# Patient Record
Sex: FEMALE | Race: WHITE | NOT HISPANIC OR LATINO | Employment: FULL TIME
[De-identification: names, ages, dates, MRNs, and addresses within clinical notes are randomized per-mention and may not be internally consistent; named-entity substitution may affect disease eponyms.]

---

## 2016-01-01 LAB — PAP SMEAR - HIM PATIENT REPORTED: NEGATIVE

## 2017-10-29 ENCOUNTER — HEALTH MAINTENANCE LETTER (OUTPATIENT)
Age: 32
End: 2017-10-29

## 2018-08-31 LAB
HBA1C MFR BLD: 5 % (ref 4.8–5.6)
HBV SURFACE AG SERPL QL IA: NEGATIVE
HIV 1+2 AB+HIV1 P24 AG SERPL QL IA: NON REACTIVE
RUBELLA ANTIBODY IGG QUANTITATIVE: NORMAL IU/ML
TSH SERPL-ACNC: 1.84 UIU/ML (ref 0.45–4.5)

## 2018-09-05 ENCOUNTER — TRANSFERRED RECORDS (OUTPATIENT)
Dept: HEALTH INFORMATION MANAGEMENT | Facility: CLINIC | Age: 33
End: 2018-09-05

## 2018-09-10 ENCOUNTER — NURSE TRIAGE (OUTPATIENT)
Dept: NURSING | Facility: CLINIC | Age: 33
End: 2018-09-10

## 2018-09-10 ENCOUNTER — OFFICE VISIT (OUTPATIENT)
Dept: FAMILY MEDICINE | Facility: CLINIC | Age: 33
End: 2018-09-10
Payer: COMMERCIAL

## 2018-09-10 VITALS
TEMPERATURE: 98.5 F | HEART RATE: 64 BPM | HEIGHT: 65 IN | SYSTOLIC BLOOD PRESSURE: 108 MMHG | DIASTOLIC BLOOD PRESSURE: 70 MMHG | WEIGHT: 237 LBS | RESPIRATION RATE: 20 BRPM | BODY MASS INDEX: 39.49 KG/M2

## 2018-09-10 DIAGNOSIS — E66.01 MORBID OBESITY (H): ICD-10-CM

## 2018-09-10 DIAGNOSIS — M54.50 ACUTE MIDLINE LOW BACK PAIN WITHOUT SCIATICA: Primary | ICD-10-CM

## 2018-09-10 PROCEDURE — 99213 OFFICE O/P EST LOW 20 MIN: CPT | Performed by: PHYSICIAN ASSISTANT

## 2018-09-10 NOTE — TELEPHONE ENCOUNTER
Leah has a history of back surgery and is currently 11.5 weeks pregnant and was in bed the entire weekend. Leah is requesting to schedule appointment.    Denies fever.

## 2018-09-10 NOTE — LETTER
[facility]  [address] [place] [address], [address]  [facility] [id number] [phone]          September 10, 2018    RE:  Leah Do                                                                                                                                                       [address]  [facility] [address]            To whom it may concern:    Leah Do is under my professional care for Acute midline low back pain without sciatica . She may return to work with the following:     When the patient returns to work, the following restrictions apply.  A) Bend: Occasionally (1-3 hours)  B) Squat: Frequently (4-8 hours)  C) Walk/Stand: Frequently (4-8 hours)  D) Reach Above Shoulders: Frequently (4-8 hours)  E) Lift, carry, push, and pull no more than: 11-20 lbs.       Sincerely,        Gualberto Vazquez PA-C

## 2018-09-10 NOTE — TELEPHONE ENCOUNTER
"  Reason for Disposition    [1] MODERATE back pain (e.g., interferes with normal activities) AND [2] present > 3 days    Additional Information    Negative: Passed out (i.e., lost consciousness, collapsed and was not responding)    Negative: Shock suspected (e.g., cold/pale/clammy skin, too weak to stand, low BP, rapid pulse)    Negative: Sounds like a life-threatening emergency to the triager    Negative: [1] SEVERE back pain (e.g., excruciating) AND [2] sudden onset AND [3] age > 60    Negative: [1] Unable to urinate (or only a few drops) > 4 hours AND     [2] bladder feels very full (e.g., palpable bladder or strong urge to urinate)    Negative: [1] Urinary or bowel incontinence (i.e., loss of bladder or bowel control) AND [2] new onset    Negative: Numbness in groin or rectal area (i.e., loss of sensation)    Negative: [1] SEVERE abdominal pain AND [2] present > 1 hour    Negative: [1] Abdominal pain AND [2] age > 60    Negative: Weakness of a leg or foot (e.g., unable to bear weight, dragging foot)    Negative: Unable to walk    Negative: Patient sounds very sick or weak to the triager    Negative: [1] SEVERE back pain (e.g., excruciating, unable to do any normal activities) AND [2] not improved 2 hours after pain medicine    Negative: [1] Pain radiates into the thigh or further down the leg AND [2] both legs    Negative: [1] Fever > 100.5 F (38.1 C) AND [2] flank pain (i.e., in side, below ribs and above hip)    Negative: [1] Pain or burning with urination AND [2] flank pain (i.e., in side, below ribs and above hip)    Negative: Numbness in a leg or foot (i.e., loss of sensation)    Negative: High-risk adult (e.g., history of cancer, HIV, or IV drug abuse)    Negative: [1] Fever AND [2] no symptoms of UTI  (Exception: has generalized muscle pains, not localized back pain)    Negative: Rash in same area as pain (may be described as \"small blisters\")    Negative: Blood in urine (red, pink, or " tea-colored)    Protocols used: BACK PAIN-ADULT-AH

## 2018-09-10 NOTE — PROGRESS NOTES
"  SUBJECTIVE:   Leah Do is a 32 year old female who presents to clinic today for the following health issues:      Back Pain       Duration: 9/6/18 - is 11.5 weeks pregnant        Specific cause: none    Description:   Location of pain: low back bilateral  Character of pain: sharp, dull ache, stabbing, gnawing, burning, fullness and waxing and waning  Pain radiation:none  New numbness or weakness in legs, not attributed to pain:  no     Intensity: severe    History:   Pain interferes with job: YES  History of back problems: surgery of lumbar fusion  Any previous MRI or X-rays: Yes- at [facility].  Date see chart  Sees a specialist for back pain:  No  Therapies tried without relief: acetaminophen (Tylenol), cold and rest    Alleviating factors:   Improved by: acetaminophen (Tylenol), cold and rest      Precipitating factors:  Worsened by: laying on left side, sitting for long periods of time, bending  1-2     Patient is 11 weeks pregnant and here to discuss bilateral low back pain that has started bothering her as of last week.  She works in retail.  Standing is actually OK.  Sitting is worse.  When laying on left side in bed it's much worse than laying on the right.  Finds it even difficult to get out of bed when she has been laying on her left.  The pain is low back bilateral without radiation.  Previous history of lumbar fusion x 2 in   2011 and 2014.  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  Fused L6-5 the first surgery, then second was to revise and fuse L5-4.  Tylenol and ice and been only minimally helpful.  Not using heat much for fear of overheating while pregnant.  She works full time and has another young child at home.    [facility].      Problem list and histories reviewed & adjusted, as indicated.  Additional history: as documented      Reviewed and updated as needed this visit by clinical staff  Tobacco  Allergies  Meds  Med Hx  Surg Hx  Fam Hx  " "Soc Hx      Reviewed and updated as needed this visit by Provider         ROS:  Constitutional, HEENT, cardiovascular, pulmonary, gi and gu systems are negative, except as otherwise noted.    OBJECTIVE:     /70 (BP Location: Right arm, Patient Position: Sitting, Cuff Size: Adult Large)  Pulse 64  Temp 98.5  F (36.9  C) (Oral)  Resp 20  Ht 5' 4.5\" (1.638 m)  Wt 237 lb (107.5 kg)  BMI 40.05 kg/m2  Body mass index is 40.05 kg/(m^2).  GENERAL: healthy, alert and no distress  MS: no gross musculoskeletal defects noted, no edema  SKIN: no suspicious lesions or rashes  NEURO: Normal strength and tone, mentation intact and speech normal  Comprehensive back pain exam:  Strength is full with knee flexion and extension, plantar and dorsiflexion, and big toe extension bilaterally. Sensation intact throughout bilateral lower extremities. Patellar reflexes intact bilaterally.  PSYCH: mentation appears normal, affect normal/bright    Diagnostic Test Results:  none     ASSESSMENT/PLAN:   1. Acute midline low back pain without sciatica  Discussed that first line treatment in pregnancy is physical therapy.  She can also use ice/heat and tylenol.  She will discuss with her OB if pain becomes intolerable.  - [facility] PT, HAND, AND CHIROPRACTIC REFERRAL    2. Morbid obesity (H)  - be aware of this in pregnancy.      Gualberto Vazquez PA-C  [facility]    "

## 2018-09-10 NOTE — MR AVS SNAPSHOT
After Visit Summary   9/10/2018    Leah Do    MRN: [id number]           Patient Information     Date Of Birth          1985        Visit Information        Provider Department      9/10/2018 10:00 AM Gualberto Vazquez PA-C [facility]        Today's Diagnoses     Acute midline low back pain without sciatica    -  1       Follow-ups after your visit        Additional Services     [facility] PT, HAND, AND CHIROPRACTIC REFERRAL       **This order will print in the [facility] Scheduling Office**    Physical Therapy, Hand Therapy and Chiropractic Care are available through:    *[facility]  *[facility]  *[facility]    Call one number to schedule at any of the above locations: [phone].    Your provider has referred you to: Physical Therapy at [facility] or [facility]    Indication/Reason for Referral: Low Back Pain  Onset of Illness: 1-2 weeks  Therapy Orders: Evaluate and Treat  Special Programs: None and Women's Health: OB/GYN Musculoskeletal Dysfunction: LBP/Sacral Pain and Pregnancy: 11 Weeks Gestation and  Joint Mobilization, Myofascial Release/Massage, Posture/Body Mechanics, Strengthening, Stretching and Ultrasound  Special Request:     Milana Cortés      Additional Comments for the Therapist or Chiropractor:     Please be aware that coverage of these services is subject to the terms and limitations of your health insurance plan.  Call member services at your health plan with any benefit or coverage questions.      Please bring the following to your appointment:    *Your personal calendar for scheduling future appointments  *Comfortable clothing                  Follow-up notes from your care team     Return in about 3 weeks (around 10/1/2018) for if symptoms are not improving.      Your next 10 appointments already scheduled     Oct 30, 2018 10:15 AM CDT   Genetic Counseling with RH GEN COUNSELOR 1   [facility] Maternal Fetal Medicine  "- [facility])    [address]  [facility] [address]   [phone]            Oct 30, 2018 11:00 AM CDT   LUCERO US COMP with [id number]   [facility] Maternal Fetal Medicine Ultrasound - [facility])    [address]  [facility] [address] [phone]           Wear comfortable clothes and leave your valuables at home.            Oct 30, 2018 11:30 AM CDT   Radiology MD with  LUCERO BHATTI   [facility] Maternal Fetal Medicine - [facility])    [address]  [facility] [id number] [phone]           Please arrive at the time given for your first appointment. This visit is used internally to schedule the physician's time during your ultrasound.              Who to contact     If you have questions or need follow up information about today's clinic visit or your schedule please contact [facility] directly at [phone].  Normal or non-critical lab and imaging results will be communicated to you by [employer], letter or phone within 4 business days after the clinic has received the results. If you do not hear from us within 7 days, please contact the clinic through [facility] or phone. If you have a critical or abnormal lab result, we will notify you by phone as soon as possible.  Submit refill requests through [employer] or call your pharmacy and they will forward the refill request to us. Please allow 3 business days for your refill to be completed.          Additional Information About Your Visit        PopUpsters Information     [employer] lets you send messages to your doctor, view your test results, renew your prescriptions, schedule appointments and more. To sign up, go to [email or web address] . Click on \"Log in\" on the left side of the screen, which will take you to the Welcome page. Then click on \"Sign up Now\" on the right side of the page.     You will be asked to enter the access " "code listed below, as well as some personal information. Please follow the directions to create your username and password.     Your access code is: [email or web address]  Expires: 2018 11:07 AM     Your access code will  in 90 days. If you need help or a new code, please call your [facility] clinic or [phone].        Care EveryWhere ID     This is your Care EveryWhere ID. This could be used by other organizations to access your [facility] medical records  [id number]        Your Vitals Were     Pulse Temperature Respirations Height BMI (Body Mass Index)       64 98.5  F (36.9  C) (Oral) 20 5' 4.5\" (1.638 m) 40.05 kg/m2        Blood Pressure from Last 3 Encounters:   09/10/18 108/70   04/10/14 111/69   13 102/49    Weight from Last 3 Encounters:   09/10/18 237 lb (107.5 kg)   14 188 lb (85.3 kg)   13 191 lb 5.8 oz (86.8 kg)              We Performed the Following     [facility] PT, HAND, AND CHIROPRACTIC REFERRAL        Primary Care Provider Office Phone # Fax #    Josee Zoe Garcia [phone]       [address] [place] [address]        Equal Access to Services     [facility] Center[facility]ALO : Hadii aad ku hadasho Soomaali, waaxda luqadaha, qaybta kaalmada adeegyada, samantha murcia . So [facility] [phone].    ATENCIÓN: Si habla español, tiene a schaeffer disposición servicios gratuitos de asistencia lingüística. Llame al [phone].    We comply with applicable federal civil rights laws and [place] laws. We do not discriminate on the basis of race, color, national origin, age, disability, sex, sexual orientation, or gender identity.            Thank you!     Thank you for choosing [facility]  for your care. Our goal is always to provide you with excellent care. Hearing back from our patients is one way we can continue to improve our services. Please take a few minutes to complete the written survey that you may receive in the mail after " your visit with us. Thank you!             Your Updated Medication List - Protect others around you: Learn how to safely use, store and throw away your medicines at www.disposemymeds.org.          This list is accurate as of 9/10/18 11:07 AM.  Always use your most recent med list.                   Brand Name Dispense Instructions for use Diagnosis    calcium carbonate 600 mg-vitamin D 400 units 600-400 MG-UNIT per tablet    CALTRATE     Take 1 tablet by mouth        DHA COMPLETE PO           folic acid 400 MCG tablet    FOLVITE     Take 2 mg by mouth 2 times daily        PRENATAL VITAMINS PO      Take 1 tablet by mouth At Bedtime        VITAMIN D3 PO      Take 2,000 Units by mouth 2 times daily

## 2018-09-13 ENCOUNTER — THERAPY VISIT (OUTPATIENT)
Dept: PHYSICAL THERAPY | Facility: CLINIC | Age: 33
End: 2018-09-13
Payer: COMMERCIAL

## 2018-09-13 DIAGNOSIS — M54.50 CHRONIC BILATERAL LOW BACK PAIN WITHOUT SCIATICA: Primary | ICD-10-CM

## 2018-09-13 DIAGNOSIS — G89.29 CHRONIC BILATERAL LOW BACK PAIN WITHOUT SCIATICA: Primary | ICD-10-CM

## 2018-09-13 PROCEDURE — 97110 THERAPEUTIC EXERCISES: CPT | Mod: GP | Performed by: PHYSICAL THERAPIST

## 2018-09-13 PROCEDURE — 97161 PT EVAL LOW COMPLEX 20 MIN: CPT | Mod: GP | Performed by: PHYSICAL THERAPIST

## 2018-09-13 NOTE — PROGRESS NOTES
[facility] Initial Evaluation  Subjective:  Patient is a 32 year old female presenting with rehab back hpi. The history is provided by the patient. No  was used.   Leah Do is a 32 year old female with a lumbar condition.  Condition occurred with:  Bending.  Condition occurred: at home.  This is a recurrent and new condition  PT reports haivng bilateral LBP that has been present for 2 weeks.  No known recent trauma.  History of L4-L6 fusions.  She is 12 weeks pregnant.  .    Patient reports pain:  Central lumbar spine.  Radiates to:  No radiation.  Pain is described as aching and is intermittent and reported as 5/10.  Associated symptoms:  Loss of motion and loss of motion/stiffness. Pain is worse during the day.  Symptoms are exacerbated by sitting and stress and relieved by ice and heat.  Since onset symptoms are gradually improving.        General health as reported by patient is fair.  Pertinent medical history includes:  Currently pregnant, implanted device and overweight.  Medical allergies: yes (See EPIC).    Current medications:  Pain medication.  Current occupation is [employer]   .  Patient is currently not working due to present treatment problem.  Primary job tasks include:  Lifting, prolonged standing and repetitive tasks.    Barriers include:  None as reported by the patient.    Red flags:  None as reported by the patient.                        Objective:  Standing Alignment:        Lumbar:  Lordosis incr  Pelvic:  Normal  Hip:  Normal            Flexibility/Screens:   Positive screens:  Lumbar and SI Joint                   Lumbar/SI Evaluation  ROM:    AROM Lumbar:   Flexion:          100% with end range stiffness  Ext:                    15% wiht ER tightness   Side Bend:        Left:  100%    Right:  100%  Rotation:           Left:     Right:   Side Glide:        Left:     Right:           Lumbar Myotomes:   normal                    Lumbar Palpation:    Tenderness present at Left:    PSIS  Tenderness not present at Left:    Quadratus Lumborum; Erector Spinae; Piriformis or Vertebral  Tenderness present at Right: PSIS  Tenderness not present at Right:  Quadratus Lumborum; Erector Spinae; Piriformis or Vertebral                                                     General     ROS    Assessment/Plan:    Patient is a 32 year old female with lumbar complaints.    Patient has the following significant findings with corresponding treatment plan.                Diagnosis 1:  LBP with pregnancy  Pain -  self management, education, directional preference exercise and home program  Decreased ROM/flexibility - manual therapy and therapeutic exercise  Decreased strength - therapeutic exercise and therapeutic activities  Impaired muscle performance - neuro re-education  Decreased function - therapeutic activities    Therapy Evaluation Codes:   1) History comprised of:   Personal factors that impact the plan of care:      None.    Comorbidity factors that impact the plan of care are:      Current pregnancy, Implanted device and Overweight.     Medications impacting care: Pain.  2) Examination of Body Systems comprised of:   Body structures and functions that impact the plan of care:      Lumbar spine.   Activity limitations that impact the plan of care are:      Sitting and Standing.  3) Clinical presentation characteristics are:   Stable/Uncomplicated.  4) Decision-Making    Low complexity using standardized patient assessment instrument and/or measureable assessment of functional outcome.  Cumulative Therapy Evaluation is: Low complexity.    Previous and current functional limitations:  (See Goal Flow Sheet for this information)    Short term and Long term goals: (See Goal Flow Sheet for this information)     Communication ability:  Patient appears to be able to clearly communicate and understand verbal and written communication and  follow directions correctly.  Treatment Explanation - The following has been discussed with the patient:   RX ordered/plan of care  Anticipated outcomes  Possible risks and side effects  This patient would benefit from PT intervention to resume normal activities.   Rehab potential is good.    Frequency:  1 X week, once daily  Duration:  for 4 weeks  Discharge Plan:  Achieve all LTG.  Independent in home treatment program.  Reach maximal therapeutic benefit.    Please refer to the daily flowsheet for treatment today, total treatment time and time spent performing 1:1 timed codes.

## 2018-09-13 NOTE — MR AVS SNAPSHOT
After Visit Summary   9/13/2018    Leah Do    MRN: [id number]           Patient Information     Date Of Birth          1985        Visit Information        Provider Department      9/13/2018 11:10 AM Christopher Veloz, PT [facility] Athletic Medicine [facility] Physical [facility]        Today's Diagnoses     Chronic bilateral low back pain without sciatica    -  1       Follow-ups after your visit        Your next 10 appointments already scheduled     Oct 30, 2018 10:15 AM CDT   Genetic Counseling with  GEN COUNSELOR 1   [facility])    [address]  [facility] [address]   [phone]            Oct 30, 2018 11:00 AM CDT   MFM US COMP with [id number]   [facility] Maternal Fetal Medicine Ultrasound - [facility])    [address]  [facility] [id number] [phone]           Wear comfortable clothes and leave your valuables at home.            Oct 30, 2018 11:30 AM CDT   Radiology MD with  LUCERO BHATTI   [facility] Maternal Fetal Medicine [facility])    [address]  [facility] [id number] [phone]           Please arrive at the time given for your first appointment. This visit is used internally to schedule the physician's time during your ultrasound.              Who to contact     If you have questions or need follow up information about today's clinic visit or your schedule please contact [facility] PH[facility] directly at [phone].  Normal or non-critical lab and imaging results will be communicated to you by MyChart, letter or phone within 4 business days after the clinic has received the results. If you do not hear from us within 7 days, please contact the clinic through MyChart or phone. If you have a critical or abnormal lab result, we will notify you by phone as soon as  "possible.  Submit refill requests through [email or web address] or call your pharmacy and they will forward the refill request to us. Please allow 3 business days for your refill to be completed.          Additional Information About Your Visit        [email or web address] Information     [email or web address] lets you send messages to your doctor, view your test results, renew your prescriptions, schedule appointments and more. To sign up, go to [email or web address] . Click on \"Log in\" on the left side of the screen, which will take you to the Welcome page. Then click on \"Sign up Now\" on the right side of the page.     You will be asked to enter the access code listed below, as well as some personal information. Please follow the directions to create your username and password.     Your access code is: [email or web address]  Expires: 2018 11:07 AM     Your access code will  in 90 days. If you need help or a new code, please call your [facility] clinic or [phone].        Care EveryWhere ID     This is your Care EveryWhere ID. This could be used by other organizations to access your [facility] medical records  [id number]         Blood Pressure from Last 3 Encounters:   09/10/18 108/70   04/10/14 111/69   13 102/49    Weight from Last 3 Encounters:   09/10/18 107.5 kg (237 lb)   14 85.3 kg (188 lb)   13 86.8 kg (191 lb 5.8 oz)              We Performed the Following     HC PT EVAL, LOW COMPLEXITY     [facility] INITIAL EVAL REPORT     THERAPEUTIC EXERCISES        Primary Care Provider Office Phone # Fax #    Josee Zoe Garcia [phone]       [address] [place] [address]        Equal Access to Services     MONIKA BENAVIDES : Darius Blank, freda hassan, tu bullockalmaisaias galvan, samantha welch. So [facility] [phone].    ATENCIÓN: Si habla español, tiene a schaeffer disposición servicios gratuitos de asistencia lingüística. Llame al [phone].    We comply with " applicable federal civil rights laws and [place] laws. We do not discriminate on the basis of race, color, national origin, age, disability, sex, sexual orientation, or gender identity.            Thank you!     Thank you for choosing [facility] PHYSICAL THERAPY  for your care. Our goal is always to provide you with excellent care. Hearing back from our patients is one way we can continue to improve our services. Please take a few minutes to complete the written survey that you may receive in the mail after your visit with us. Thank you!             Your Updated Medication List - Protect others around you: Learn how to safely use, store and throw away your medicines at www.disposemymeds.org.          This list is accurate as of 9/13/18 11:41 AM.  Always use your most recent med list.                   Brand Name Dispense Instructions for use Diagnosis    calcium carbonate 600 mg-vitamin D 400 units 600-400 MG-UNIT per tablet    CALTRATE     Take 1 tablet by mouth        DHA COMPLETE PO           folic acid 400 MCG tablet    FOLVITE     Take 2 mg by mouth 2 times daily        PRENATAL VITAMINS PO      Take 1 tablet by mouth At Bedtime        VITAMIN D3 PO      Take 2,000 Units by mouth 2 times daily

## 2018-09-22 ENCOUNTER — HOSPITAL ENCOUNTER (EMERGENCY)
Facility: CLINIC | Age: 33
Discharge: HOME | End: 2018-09-22
Attending: EMERGENCY MEDICINE | Admitting: EMERGENCY MEDICINE
Payer: COMMERCIAL

## 2018-09-22 VITALS
TEMPERATURE: 98.7 F | DIASTOLIC BLOOD PRESSURE: 91 MMHG | RESPIRATION RATE: 20 BRPM | BODY MASS INDEX: 39.27 KG/M2 | HEART RATE: 71 BPM | HEIGHT: 64 IN | OXYGEN SATURATION: 100 % | SYSTOLIC BLOOD PRESSURE: 144 MMHG | WEIGHT: 230 LBS

## 2018-09-22 DIAGNOSIS — O46.90 VAGINAL BLEEDING IN PREGNANCY: ICD-10-CM

## 2018-09-22 PROCEDURE — 76815 OB US LIMITED FETUS(S): CPT

## 2018-09-22 PROCEDURE — 99284 EMERGENCY DEPT VISIT MOD MDM: CPT | Mod: 25

## 2018-09-22 ASSESSMENT — ENCOUNTER SYMPTOMS
VOMITING: 1
NAUSEA: 1
DIARRHEA: 0
HEMATURIA: 0
FREQUENCY: 0
BLOOD IN STOOL: 0
CONSTIPATION: 0
DYSURIA: 0

## 2018-09-22 NOTE — DISCHARGE INSTRUCTIONS
Please make an appointment to follow up with OB/GYN in 2-3 days if not improving.      Return to ER immediately if you develop: worsening bleeding (>1 pad or tampon per hour), severe abdominal or pelvic pain, new lightheadedness/dizziness/shortness of breath, Fever > 101, persistent nausea or vomiting OR you have any other concerns about your health.

## 2018-09-22 NOTE — ED AVS SNAPSHOT
[facility] Emergency Department    [address]    [facility] [address]    Phone:  [phone]    Fax:  [phone]                                       Leah Do   MRN: [id number]    Department:  [facility] Emergency Department   Date of Visit:  9/22/2018           After Visit Summary Signature Page     I have received my discharge instructions, and my questions have been answered. I have discussed any challenges I see with this plan with the nurse or doctor.    ..........................................................................................................................................  Patient/Patient Representative Signature      ..........................................................................................................................................  Patient Representative Print Name and Relationship to Patient    ..................................................               ................................................  Date                                   Time    ..........................................................................................................................................  Reviewed by Signature/Title    ...................................................              ..............................................  Date                                               Time          22EPIC Rev 08/18

## 2018-09-22 NOTE — ED AVS SNAPSHOT
[facility] Emergency Department    [address] [facility] [address]    Phone:  [phone]    Fax:  [phone]                                       Leah Do   MRN: [id number]    Department:  [facility] Emergency Department   Date of Visit:  9/22/2018           Patient Information     Date Of Birth          1985        Your diagnoses for this visit were:     Vaginal bleeding in pregnancy        You were seen by Luis Carlos Guidry MD.        Discharge Instructions       Please make an appointment to follow up with OB/GYN in 2-3 days if not improving.      Return to ER immediately if you develop: worsening bleeding (>1 pad or tampon per hour), severe abdominal or pelvic pain, new lightheadedness/dizziness/shortness of breath, Fever > 101, persistent nausea or vomiting OR you have any other concerns about your health.            Your next 10 appointments already scheduled     Oct 30, 2018 10:15 AM CDT   Genetic Counseling with RH GEN COUNSELOR 1   [facility] M[facility] Fetal [facility])    [address]  [facility] [id number] [phone]            Oct 30, 2018 11:00 AM CDT   MFM US COMP with KATERINAFMUSLISA   [facility] Maternal [facility]etal Medicine Ultrasound - [facility])    [address]  [facility] [address] [phone]           Wear comfortable clothes and leave your valuables at home.            Oct 30, 2018 11:30 AM CDT   Radiology MD with  LUCERO BHATTI   [facility])    [address]  [facility] [id number] [phone]           Please arrive at the time given for your first appointment. This visit is used internally to schedule the physician's time during your ultrasound.              24 Hour Appointment Hotline       To make an appointment at any [facility], call [phone]  ([phone]). If you don't have a family doctor or clinic, we will help you find one. [facility] clinics are conveniently located to serve the needs of you and your family.             Review of your medicines      Our records show that you are taking the medicines listed below. If these are incorrect, please call your family doctor or clinic.        Dose / Directions Last dose taken    calcium carbonate 600 mg-vitamin D 400 units 600-400 MG-UNIT per tablet   Commonly known as:  CALTRATE   Dose:  1 tablet        Take 1 tablet by mouth   Refills:  0        DHA COMPLETE PO        Refills:  0        folic acid 400 MCG tablet   Commonly known as:  FOLVITE   Dose:  2 mg        Take 2 mg by mouth 2 times daily   Refills:  0        PRENATAL VITAMINS PO   Dose:  1 tablet        Take 1 tablet by mouth At Bedtime   Refills:  0        VITAMIN D3 PO   Dose:  2000 Units        Take 2,000 Units by mouth 2 times daily   Refills:  0                Procedures and tests performed during your visit     POC US OB TRANSABDOMINAL LIMITED      Orders Needing Specimen Collection     None      Pending Results     Date and Time Order Name Status Description    9/22/2018 0107 POC US OB TRANSABDOMINAL LIMITED In process             Pending Culture Results     No orders found from 9/20/2018 to 9/23/2018.            Pending Results Instructions     If you had any lab results that were not finalized at the time of your Discharge, you can call the ED Lab Result RN at [phone]. You will be contacted by this team for any positive Lab results or changes in treatment. The nurses are available 7 days a week from 10A to 6:30P.  You can leave a message 24 hours per day and they will return your call.        Test Results From Your Hospital Stay        9/22/2018  1:07 AM      Result not yet available     Exam Begun                Clinical Quality Measure: Blood Pressure Screening     Your blood pressure was checked while you were in the emergency  "department today. The last reading we obtained was  BP: (!) 144/91 . Please read the guidelines below about what these numbers mean and what you should do about them.  If your systolic blood pressure (the top number) is less than 120 and your diastolic blood pressure (the bottom number) is less than 80, then your blood pressure is normal. There is nothing more that you need to do about it.  If your systolic blood pressure (the top number) is 120-139 or your diastolic blood pressure (the bottom number) is 80-89, your blood pressure may be higher than it should be. You should have your blood pressure rechecked within a year by a primary care provider.  If your systolic blood pressure (the top number) is 140 or greater or your diastolic blood pressure (the bottom number) is 90 or greater, you may have high blood pressure. High blood pressure is treatable, but if left untreated over time it can put you at risk for heart attack, stroke, or kidney failure. You should have your blood pressure rechecked by a primary care provider within the next 4 weeks.  If your provider in the emergency department today gave you specific instructions to follow-up with your doctor or provider even sooner than that, you should follow that instruction and not wait for up to 4 weeks for your follow-up visit.        Thank you for choosing [facility]       Thank you for choosing [facility] for your care. Our goal is always to provide you with excellent care. Hearing back from our patients is one way we can continue to improve our services. Please take a few minutes to complete the written survey that you may receive in the mail after you visit with us. Thank you!        [email or web address] Information     [employer] lets you send messages to your doctor, view your test results, renew your prescriptions, schedule appointments and more. To sign up, go to [email or web address] . Click on \"Log in\" on the left side of the screen, which will take you to the Welcome " "page. Then click on \"Sign up Now\" on the right side of the page.     You will be asked to enter the access code listed below, as well as some personal information. Please follow the directions to create your username and password.     Your access code is: [email or web address]  Expires: 2018 11:07 AM     Your access code will  in 90 days. If you need help or a new code, please call your [facility] clinic or [phone].        Care EveryWhere ID     This is your Care EveryWhere ID. This could be used by other organizations to access your [facility] medical records  [id number]        Equal Access to Services     [facility] Center[facility] : Darius Blank, freda hassan, tu galvan, samantha welch. So [facility] [phone].    ATENCIÓN: Si habla español, tiene a schaeffer disposición servicios gratuitos de asistencia lingüística. Llame al [phone].    We comply with applicable federal civil rights laws and [place] laws. We do not discriminate on the basis of race, color, national origin, age, disability, sex, sexual orientation, or gender identity.            After Visit Summary       This is your record. Keep this with you and show to your community pharmacist(s) and doctor(s) at your next visit.                  "

## 2018-10-08 PROBLEM — M54.50 CHRONIC BILATERAL LOW BACK PAIN WITHOUT SCIATICA: Status: RESOLVED | Noted: 2018-09-13 | Resolved: 2018-10-08

## 2018-10-08 PROBLEM — G89.29 CHRONIC BILATERAL LOW BACK PAIN WITHOUT SCIATICA: Status: RESOLVED | Noted: 2018-09-13 | Resolved: 2018-10-08

## 2018-10-23 ENCOUNTER — PRE VISIT (OUTPATIENT)
Dept: MATERNAL FETAL MEDICINE | Facility: CLINIC | Age: 33
End: 2018-10-23

## 2018-10-30 ENCOUNTER — HOSPITAL ENCOUNTER (OUTPATIENT)
Dept: ULTRASOUND IMAGING | Facility: CLINIC | Age: 33
Discharge: HOME | End: 2018-10-30
Attending: OBSTETRICS & GYNECOLOGY | Admitting: OBSTETRICS & GYNECOLOGY
Payer: COMMERCIAL

## 2018-10-30 ENCOUNTER — OFFICE VISIT (OUTPATIENT)
Dept: MATERNAL FETAL MEDICINE | Facility: CLINIC | Age: 33
End: 2018-10-30
Attending: OBSTETRICS & GYNECOLOGY
Payer: COMMERCIAL

## 2018-10-30 DIAGNOSIS — Z82.79 FAMILY HISTORY OF SPINA BIFIDA: ICD-10-CM

## 2018-10-30 DIAGNOSIS — O09.522 SUPERVISION OF ELDERLY MULTIGRAVIDA IN SECOND TRIMESTER: Primary | ICD-10-CM

## 2018-10-30 DIAGNOSIS — O26.90 PREGNANCY RELATED CONDITION, ANTEPARTUM: ICD-10-CM

## 2018-10-30 DIAGNOSIS — O35.9XX0 SUSPECTED FETAL ANOMALY, ANTEPARTUM, SINGLE OR UNSPECIFIED FETUS: ICD-10-CM

## 2018-10-30 DIAGNOSIS — O09.522 ELDERLY MULTIGRAVIDA IN SECOND TRIMESTER: Primary | ICD-10-CM

## 2018-10-30 PROCEDURE — 76811 OB US DETAILED SNGL FETUS: CPT

## 2018-10-30 PROCEDURE — 96040 ZZH GENETIC COUNSELING, EACH 30 MINUTES: CPT | Mod: ZF | Performed by: GENETIC COUNSELOR, MS

## 2018-10-30 NOTE — MR AVS SNAPSHOT
After Visit Summary   10/30/2018    Leah Do    MRN: [id number]           Patient Information     Date Of Birth          1985        Visit Information        Provider Department      10/30/2018 10:15 AM Ty Keita GC [facility] Maternal Fetal Medicine [facility]        Today's Diagnoses     Supervision of elderly multigravida in second trimester    -  1    Pregnancy related condition, antepartum        Family history of spina bifida           Follow-ups after your visit        Your next 10 appointments already scheduled     Nov 27, 2018 10:15 AM CST   MFM US COMPRE SINGLE F/U with [id number]   [facility] Maternal Fetal Medicine Ultrasound - [facility] ([facility])    [address]  [facility] [address] [phone]           Wear comfortable clothes and leave your valuables at home.            Nov 27, 2018 10:45 AM CST   Radiology MD with  MFM MD   [facility] Maternal Fetal Medicine [facility] ([facility])    [address]  [facility] [address] [phone]           Please arrive at the time given for your first appointment. This visit is used internally to schedule the physician's time during your ultrasound.              Future tests that were ordered for you today     Open Future Orders        Priority Expected Expires Ordered    MFM US Comprehensive Single F/U Routine 11/27/2018 10/30/2019 10/30/2018            Who to contact     If you have questions or need follow up information about today's clinic visit or your schedule please contact [facility] directly at [phone].  Normal or non-critical lab and imaging results will be communicated to you by MyChart, letter or phone within 4 business days after the clinic has received the results. If you do not hear from us within 7 days, please contact the clinic through MyChart or phone. If you have a critical or abnormal lab result, we will  notify you by phone as soon as possible.  Submit refill requests through [employer] or call your pharmacy and they will forward the refill request to us. Please allow 3 business days for your refill to be completed.          Additional Information About Your Visit        Care EveryWhere ID     This is your Care EveryWhere ID. This could be used by other organizations to access your [facility] medical records  [id number]        Your Vitals Were     Last Period                   06/22/2018            Blood Pressure from Last 3 Encounters:   09/22/18 (!) 144/91   09/10/18 108/70   04/10/14 111/69    Weight from Last 3 Encounters:   09/22/18 104.3 kg (230 lb)   09/10/18 107.5 kg (237 lb)   04/07/14 85.3 kg (188 lb)              We Performed the Following     [facility] Genetic Counseling        Primary Care Provider Office Phone # Fax #    Josee Zoe Garcia [phone]       [address] [facility] [address]        Equal Access to Services     DUYEN BENAVIDES : Hadii aad ku hadasho Soomaali, waaxda luqadaha, qaybta kaalmada adeegyada, waxay jacintoin hayonofre murcia . So [facility] [phone].    ATENCIÓN: Si habla español, tiene a schaeffer disposición servicios gratuitos de asistencia lingüística. [facility] [phone].    We comply with applicable federal civil rights laws and [place] laws. We do not discriminate on the basis of race, color, national origin, age, disability, sex, sexual orientation, or gender identity.            Thank you!     Thank you for choosing [facility]  for your care. Our goal is always to provide you with excellent care. Hearing back from our patients is one way we can continue to improve our services. Please take a few minutes to complete the written survey that you may receive in the mail after your visit with us. Thank you!             Your Updated Medication List - Protect others around you: Learn how to safely use, store and throw  away your medicines at www.disposemymeds.org.          This list is accurate as of 10/30/18  2:58 PM.  Always use your most recent med list.                   Brand Name Dispense Instructions for use Diagnosis    calcium carbonate 600 mg-vitamin D 400 units 600-400 MG-UNIT per tablet    CALTRATE     Take 1 tablet by mouth        DHA COMPLETE PO           folic acid 400 MCG tablet    FOLVITE     Take 2 mg by mouth 2 times daily        PRENATAL VITAMINS PO      Take 1 tablet by mouth At Bedtime        VITAMIN D3 PO      Take 2,000 Units by mouth 2 times daily

## 2018-10-30 NOTE — PROGRESS NOTES
[facility] Maternal Fetal Medicine Center  Genetic Counseling Consult    Patient:  Leah Do YOB: 1985   Date of Service:  10/30/18      Leah Do was seen at the [facility]etal [facility] Center for genetic consultation as part of her appointment for comprehensive ultrasound.  The indication for genetic counseling is advanced maternal age.  Leah was accompanied to her appointment today by the father of the pregnancy Justine.         Impression/Plan:   1. Leah had a quad screen earlier in pregnancy, which was screen negative.    2. Leah had a comprehensive (level II) ultrasound today.  Please see the ultrasound report for further details.    Pregnancy History:   /Parity:    Age at Delivery: 33 year old  CHIP: 3/29/2019, by Last Menstrual Period  Gestational Age: 18w4d    No significant complications or exposures were reported in the current pregnancy.    Leah s pregnancy history is significant for 1 full term pregnancy with no reported complications, 1 TAB at ~20 weeks gestational age due to open neural tube defect, and 1 first trimester SAB with no known cause.  All of these pregnancies occurred with a previous partner.  This is Leah's first pregnancy with her current partner Justine.  Discussion of Leah's pregnancy history was limited today, but we discussed that the quad screen indicated low risk for open neural tube defects, and today ultrasound was normal for spinal views.    Medical History:   Leah s reported medical history is not expected to impact pregnancy management or risks to fetal development.       Family History:   A three-generation pedigree was obtained, and is scanned under the  Media  tab.       Leah had a pregnancy with a previous partner diagnosed with an open neural tube defect and ultimately decided to end the pregnancy.  Records where not available for review and Leah opted to not discuss this  history today.  We briefly discussed that the quad screen results for the current pregnancy lower the risks for a neural tube defect to be affecting the pregnancy.    The reported family history is negative for multiple miscarriages, stillbirths, birth defects, mental retardation, known genetic conditions, and consanguinity.       Carrier Screening:   The patient reports that she and the father of the pregnancy have  ancestry:      Cystic fibrosis is an autosomal recessive genetic condition that occurs with increased frequency in individuals of  ancestry and carrier screening for this condition is available.  In addition,  screening in the [facility] includes cystic fibrosis.      Expanded carrier screening for mutations in a large panel of genes associated with autosomal recessive conditions including cystic fibrosis, spinal muscular atrophy, and others, is now available.      The patient has declined the carrier screening options reviewed today.       Risk Assessment for Chromosome Conditions:   We explained that the risk for fetal chromosome abnormalities increases with maternal age. We discussed specific features of common chromosome abnormalities, including Down syndrome, trisomy 13, trisomy 18, and sex chromosome trisomies.      - At age 33 at midtrimester, the risk to have a baby with Down syndrome is 1 in 274.     - At age 33 at midtrimester, the risk to have a baby with any chromosome abnormality is 1 in 135.     - At age 33 at delivery, the risk to have a baby with Down syndrome is 1 in 385.     - At age 33 at delivery, the risk to have a baby with any chromosome abnormality is 1 in 202.       Leah had maternal serum screening earlier in pregnancy.    Quad screen    Maternal plasma AFP, hCG, estriol, and inhibin measurement between 15-24 weeks gestation    Provides risk assessment for fetal Down syndrome, trisomy 18, and neural tube defects    Leah had a quad screen  earlier in pregnancy; we reviewed the results today, which were Screen Negative    Chemical values were as follows    AFP 1.1 MoM, hCG 0.97 MoM, estriol 1.84 MoM, and FAM 1.02 MoM    This screen gave the following risk assessments:    Down syndrome risk= 1:3121    Trisomy 18 risk= 1:1041    Open neural tube defects risk= 1:8933         Testing Options:   We discussed the following options:   Non-invasive Prenatal Testing (NIPT)    Maternal plasma cell-free DNA testing; first trimester ultrasound with nuchal translucency and nasal bone assessment is recommended, when appropriate    Screens for fetal trisomy 21, trisomy 13, trisomy 18, and sex chromosome aneuploidy    Cannot screen for open neural tube defects; maternal serum AFP after 15 weeks is recommended       Genetic Amniocentesis    Invasive procedure typically performed in the second trimester by which amniotic fluid is obtained for the purpose of chromosome analysis and/or other prenatal genetic analysis    Diagnostic results; >99% sensitivity for fetal chromosome abnormalities    AFAFP measurement tests for open neural tube defects       Comprehensive (Level II) ultrasound:     Detailed ultrasound performed between 18-22 weeks gestation    Screen for major birth defects and markers for aneuploidy    We reviewed the benefits and limitations of this testing.  Screening tests provide a risk assessment specific to the pregnancy for certain fetal chromosome abnormalities, but cannot definitively diagnose or exclude a fetal chromosome abnormality.  Follow-up genetic counseling and consideration of diagnostic testing is recommended with any abnormal screening result. Diagnostic tests carry inherent risks- including risk of miscarriage- that require careful consideration.  These tests can detect fetal chromosome abnormalities with greater than 99% certainty.  Results can be compromised by maternal cell contamination or mosaicism, and are limited by the resolution of  cytogenetic G-banding technology.  There is no screening nor diagnostic test that can detect all forms of birth defects or mental disability.    It was a pleasure to be involved with eLah s care. Face-to-face time of the meeting was 30 minutes.    Ty Keita MS, [facility]  Licensed Genetic Counselor  Phone: [phone]  Pager: [phone]

## 2018-10-30 NOTE — PROGRESS NOTES
"Please see \"Imaging\" tab under \"Chart Review\" for details of today's visit.    Niels Contreras    "

## 2018-10-30 NOTE — MR AVS SNAPSHOT
After Visit Summary   10/30/2018    Leah Do    MRN: [id number]           Patient Information     Date Of Birth          1985        Visit Information        Provider Department      10/30/2018 11:30 AM Niels Contreras MD [facility] Maternal Fetal Medicine [facility]        Today's Diagnoses     Elderly multigravida in second trimester    -  1    Suspected fetal anomaly, antepartum, single or unspecified fetus           Follow-ups after your visit        Your next 10 appointments already scheduled     Nov 27, 2018 10:15 AM CST   MFM US COMPRE SINGLE F/U with [id number]   [facility] Maternal Fetal Medicine Ultrasound - [facility] ([facility])    [address]  [facility] [address] [phone]           Wear comfortable clothes and leave your valuables at home.            Nov 27, 2018 10:45 AM CST   Radiology MD with  MFTRINI BHATTI   [facility] Maternal Fetal Medicine [facility] ([facility]    [address]  [facility] [address] [phone]           Please arrive at the time given for your first appointment. This visit is used internally to schedule the physician's time during your ultrasound.              Future tests that were ordered for you today     Open Future Orders        Priority Expected Expires Ordered    MFM US Comprehensive Single F/U Routine 11/27/2018 10/30/2019 10/30/2018            Who to contact     If you have questions or need follow up information about today's clinic visit or your schedule please contact Health system [facility]ET[facility] directly at [phone].  Normal or non-critical lab and imaging results will be communicated to you by MyChart, letter or phone within 4 business days after the clinic has received the results. If you do not hear from us within 7 days, please contact the clinic through MyChart or phone. If you have a critical or abnormal lab result, we will notify you by phone as soon as  possible.  Submit refill requests through [employer] or call your pharmacy and they will forward the refill request to us. Please allow 3 business days for your refill to be completed.          Additional Information About Your Visit        Care EveryWhere ID     This is your Care EveryWhere ID. This could be used by other organizations to access your [place] medical records  [id number]        Your Vitals Were     Last Period                   06/22/2018            Blood Pressure from Last 3 Encounters:   09/22/18 (!) 144/91   09/10/18 108/70   04/10/14 111/69    Weight from Last 3 Encounters:   09/22/18 104.3 kg (230 lb)   09/10/18 107.5 kg (237 lb)   04/07/14 85.3 kg (188 lb)               Primary Care Provider Office Phone # Fax #    Josee Zoe Garcia [phone]       [address] [facility] [address]        Equal Access to Services     MONIKA BENAVIDES : Hadii aad ku hadasho Soomaali, waaxda luqadaha, qaybta kaalmada adeegyada, samantha murcia . So [facility] [phone].    ATENCIÓN: Si habla español, tiene a schaeffer disposición servicios gratuitos de asistencia lingüística. Llame al [phone].    We comply with applicable federal civil rights laws and [place] laws. We do not discriminate on the basis of race, color, national origin, age, disability, sex, sexual orientation, or gender identity.            Thank you!     Thank you for choosing [facility]  for your care. Our goal is always to provide you with excellent care. Hearing back from our patients is one way we can continue to improve our services. Please take a few minutes to complete the written survey that you may receive in the mail after your visit with us. Thank you!             Your Updated Medication List - Protect others around you: Learn how to safely use, store and throw away your medicines at www.disposemymeds.org.          This list is accurate as of 10/30/18   1:40 PM.  Always use your most recent med list.                   Brand Name Dispense Instructions for use Diagnosis    calcium carbonate 600 mg-vitamin D 400 units 600-400 MG-UNIT per tablet    CALTRATE     Take 1 tablet by mouth        DHA COMPLETE PO           folic acid 400 MCG tablet    FOLVITE     Take 2 mg by mouth 2 times daily        PRENATAL VITAMINS PO      Take 1 tablet by mouth At Bedtime        VITAMIN D3 PO      Take 2,000 Units by mouth 2 times daily

## 2018-11-27 ENCOUNTER — HOSPITAL ENCOUNTER (OUTPATIENT)
Dept: ULTRASOUND IMAGING | Facility: CLINIC | Age: 33
Discharge: HOME | End: 2018-11-27
Attending: OBSTETRICS & GYNECOLOGY | Admitting: OBSTETRICS & GYNECOLOGY
Payer: COMMERCIAL

## 2018-11-27 ENCOUNTER — OFFICE VISIT (OUTPATIENT)
Dept: MATERNAL FETAL MEDICINE | Facility: CLINIC | Age: 33
End: 2018-11-27
Attending: OBSTETRICS & GYNECOLOGY
Payer: COMMERCIAL

## 2018-11-27 DIAGNOSIS — O35.9XX0 SUSPECTED FETAL ANOMALY, ANTEPARTUM, SINGLE OR UNSPECIFIED FETUS: ICD-10-CM

## 2018-11-27 DIAGNOSIS — O09.522 ELDERLY MULTIGRAVIDA IN SECOND TRIMESTER: Primary | ICD-10-CM

## 2018-11-27 PROCEDURE — 76816 OB US FOLLOW-UP PER FETUS: CPT

## 2018-11-27 NOTE — PROGRESS NOTES
Please see ultrasound report under imaging tab for details on ultrasound performed today.    Camelia Cox MD  , OB/GYN  Maternal-Fetal Medicine  [email or web address]  [phone] (Academic office)  [phone] (Pager)

## 2018-11-27 NOTE — MR AVS SNAPSHOT
After Visit Summary   11/27/2018    Leah Do    MRN: [id number]           Patient Information     Date Of Birth          1985        Visit Information        Provider Department      11/27/2018 10:45 AM Camelia Cox MD [facility] Maternal Fetal Medicine [facility]        Today's Diagnoses     Elderly multigravida in second trimester    -  1       Follow-ups after your visit        Who to contact     If you have questions or need follow up information about today's clinic visit or your schedule please contact [facility] directly at [phone].  Normal or non-critical lab and imaging results will be communicated to you by MyChart, letter or phone within 4 business days after the clinic has received the results. If you do not hear from us within 7 days, please contact the clinic through MyChart or phone. If you have a critical or abnormal lab result, we will notify you by phone as soon as possible.  Submit refill requests through [employer] or call your pharmacy and they will forward the refill request to us. Please allow 3 business days for your refill to be completed.          Additional Information About Your Visit        Care EveryWhere ID     This is your Care EveryWhere ID. This could be used by other organizations to access your [place] medical records  [id number]        Your Vitals Were     Last Period                   06/22/2018            Blood Pressure from Last 3 Encounters:   09/22/18 (!) 144/91   09/10/18 108/70   04/10/14 111/69    Weight from Last 3 Encounters:   09/22/18 104.3 kg (230 lb)   09/10/18 107.5 kg (237 lb)   04/07/14 85.3 kg (188 lb)              Today, you had the following     No orders found for display       Primary Care Provider Office Phone # Fax #    Josee Zoe Garcia [phone]       [address] [place] [address]        Equal Access to Services     MONIKA BENAVIDES AH: Darius salomon  patrick Blank, freda hassan, tu kobesana rachelrandall, waxjulio cesar idiin hayphillyjosé princeelainacarol murcia rebekah. So [facility] [phone].    ATENCIÓN: Si habla español, tiene a schaeffer disposición servicios gratuitos de asistencia lingüística. Ian al [phone].    We comply with applicable federal civil rights laws and [place] laws. We do not discriminate on the basis of race, color, national origin, age, disability, sex, sexual orientation, or gender identity.            Thank you!     Thank you for choosing [facility]  for your care. Our goal is always to provide you with excellent care. Hearing back from our patients is one way we can continue to improve our services. Please take a few minutes to complete the written survey that you may receive in the mail after your visit with us. Thank you!             Your Updated Medication List - Protect others around you: Learn how to safely use, store and throw away your medicines at www.disposemymeds.org.          This list is accurate as of 11/27/18  1:17 PM.  Always use your most recent med list.                   Brand Name Dispense Instructions for use Diagnosis    calcium carbonate 600 mg-vitamin D 400 units 600-400 MG-UNIT per tablet    CALTRATE     Take 1 tablet by mouth        DHA COMPLETE PO           folic acid 400 MCG tablet    FOLVITE     Take 2 mg by mouth 2 times daily        PRENATAL VITAMINS PO      Take 1 tablet by mouth At Bedtime        VITAMIN D3 PO      Take 2,000 Units by mouth 2 times daily

## 2019-02-07 ENCOUNTER — HOSPITAL ENCOUNTER (OUTPATIENT)
Facility: CLINIC | Age: 34
Discharge: HOME | End: 2019-02-07
Attending: OBSTETRICS & GYNECOLOGY | Admitting: OBSTETRICS & GYNECOLOGY
Payer: COMMERCIAL

## 2019-02-07 PROCEDURE — 96372 THER/PROPH/DIAG INJ SC/IM: CPT

## 2019-02-07 PROCEDURE — 25000128 H RX IP 250 OP 636: Performed by: OBSTETRICS & GYNECOLOGY

## 2019-02-07 RX ORDER — BETAMETHASONE SODIUM PHOSPHATE AND BETAMETHASONE ACETATE 3; 3 MG/ML; MG/ML
12 INJECTION, SUSPENSION INTRA-ARTICULAR; INTRALESIONAL; INTRAMUSCULAR; SOFT TISSUE ONCE
Status: COMPLETED | OUTPATIENT
Start: 2019-02-07 | End: 2019-02-07

## 2019-02-07 RX ADMIN — BETAMETHASONE SODIUM PHOSPHATE AND BETAMETHASONE ACETATE 12 MG: 3; 3 INJECTION, SUSPENSION INTRA-ARTICULAR; INTRALESIONAL; INTRAMUSCULAR at 16:57

## 2019-02-07 NOTE — PLAN OF CARE
Patient here for 2nd betamethasone.  Pt feeling baby move.  Pt denies contractions and any leaking of fluid.  Injection given in L deltoid per patient request.

## 2019-03-09 ENCOUNTER — HEALTH MAINTENANCE LETTER (OUTPATIENT)
Age: 34
End: 2019-03-09

## 2019-03-11 ASSESSMENT — MIFFLIN-ST. JEOR: SCORE: 1786.78

## 2019-03-12 NOTE — PHARMACY-ADMISSION MEDICATION HISTORY
Pharmacy reviewed prior to admission med list from pre-admitting rn, AISSATOU Fiore.      Prior to Admission medications    Medication Sig Last Dose Taking? Auth Provider   calcium carbonate 600 mg-vitamin D 400 units (CALTRATE) 600-400 MG-UNIT per tablet Take 1 tablet by mouth 2 times daily   Yes Reported, Patient   Cholecalciferol (VITAMIN D3 PO) Take 2,000 Units by mouth 2 times daily  Yes Reported, Patient   Docosahexaenoic Acid (DHA COMPLETE PO) Take 1 tablet by mouth daily   Yes Reported, Patient   folic acid (FOLVITE) 400 MCG tablet Take 2 mg by mouth 2 times daily   Yes Reported, Patient   PRENATAL VITAMINS PO Take 1 tablet by mouth At Bedtime   Yes Reported, Patient

## 2019-03-14 ENCOUNTER — HOSPITAL ENCOUNTER (OUTPATIENT)
Dept: LAB | Facility: CLINIC | Age: 34
Discharge: HOME | End: 2019-03-14
Attending: OBSTETRICS & GYNECOLOGY | Admitting: OBSTETRICS & GYNECOLOGY
Payer: COMMERCIAL

## 2019-03-14 DIAGNOSIS — Z01.812 PRE-OPERATIVE LABORATORY EXAMINATION: ICD-10-CM

## 2019-03-14 LAB
ABO + RH BLD: NORMAL
ABO + RH BLD: NORMAL
BLD GP AB SCN SERPL QL: NORMAL
BLOOD BANK CMNT PATIENT-IMP: NORMAL
HGB BLD-MCNC: 12.4 G/DL (ref 11.7–15.7)
SPECIMEN EXP DATE BLD: NORMAL

## 2019-03-14 PROCEDURE — 86901 BLOOD TYPING SEROLOGIC RH(D): CPT | Performed by: OBSTETRICS & GYNECOLOGY

## 2019-03-14 PROCEDURE — 86850 RBC ANTIBODY SCREEN: CPT | Performed by: OBSTETRICS & GYNECOLOGY

## 2019-03-14 PROCEDURE — 85018 HEMOGLOBIN: CPT | Performed by: OBSTETRICS & GYNECOLOGY

## 2019-03-14 PROCEDURE — 86900 BLOOD TYPING SEROLOGIC ABO: CPT | Performed by: OBSTETRICS & GYNECOLOGY

## 2019-03-14 PROCEDURE — 0064U ANTB TP TOTAL&RPR IA QUAL: CPT | Performed by: OBSTETRICS & GYNECOLOGY

## 2019-03-14 PROCEDURE — 36415 COLL VENOUS BLD VENIPUNCTURE: CPT | Performed by: OBSTETRICS & GYNECOLOGY

## 2019-03-15 ENCOUNTER — HOSPITAL ENCOUNTER (INPATIENT)
Facility: CLINIC | Age: 34
LOS: 3 days | Discharge: HOME | End: 2019-03-18
Attending: OBSTETRICS & GYNECOLOGY | Admitting: OBSTETRICS & GYNECOLOGY
Payer: COMMERCIAL

## 2019-03-15 ENCOUNTER — ANESTHESIA EVENT (OUTPATIENT)
Dept: OBGYN | Facility: CLINIC | Age: 34
End: 2019-03-15
Payer: COMMERCIAL

## 2019-03-15 ENCOUNTER — ANESTHESIA (OUTPATIENT)
Dept: OBGYN | Facility: CLINIC | Age: 34
End: 2019-03-15
Payer: COMMERCIAL

## 2019-03-15 DIAGNOSIS — Z98.891 S/P CESAREAN SECTION: Primary | ICD-10-CM

## 2019-03-15 LAB — T PALLIDUM AB SER QL: NONREACTIVE

## 2019-03-15 PROCEDURE — 25000128 H RX IP 250 OP 636: Performed by: NURSE ANESTHETIST, CERTIFIED REGISTERED

## 2019-03-15 PROCEDURE — 37000008 ZZH ANESTHESIA TECHNICAL FEE, 1ST 30 MIN: Performed by: OBSTETRICS & GYNECOLOGY

## 2019-03-15 PROCEDURE — 36000056 ZZH SURGERY LEVEL 3 1ST 30 MIN: Performed by: OBSTETRICS & GYNECOLOGY

## 2019-03-15 PROCEDURE — 25000132 ZZH RX MED GY IP 250 OP 250 PS 637: Performed by: OBSTETRICS & GYNECOLOGY

## 2019-03-15 PROCEDURE — 25000125 ZZHC RX 250: Performed by: OBSTETRICS & GYNECOLOGY

## 2019-03-15 PROCEDURE — 25000128 H RX IP 250 OP 636: Performed by: ANESTHESIOLOGY

## 2019-03-15 PROCEDURE — 12000000 ZZH R&B MED SURG/OB

## 2019-03-15 PROCEDURE — 71000012 ZZH RECOVERY PHASE 1 LEVEL 1 FIRST HR: Performed by: OBSTETRICS & GYNECOLOGY

## 2019-03-15 PROCEDURE — 25000128 H RX IP 250 OP 636: Performed by: OBSTETRICS & GYNECOLOGY

## 2019-03-15 PROCEDURE — 27210794 ZZH OR GENERAL SUPPLY STERILE: Performed by: OBSTETRICS & GYNECOLOGY

## 2019-03-15 PROCEDURE — 25800030 ZZH RX IP 258 OP 636: Performed by: OBSTETRICS & GYNECOLOGY

## 2019-03-15 PROCEDURE — 37000009 ZZH ANESTHESIA TECHNICAL FEE, EACH ADDTL 15 MIN: Performed by: OBSTETRICS & GYNECOLOGY

## 2019-03-15 PROCEDURE — 25000125 ZZHC RX 250: Performed by: NURSE ANESTHETIST, CERTIFIED REGISTERED

## 2019-03-15 PROCEDURE — 36000058 ZZH SURGERY LEVEL 3 EA 15 ADDTL MIN: Performed by: OBSTETRICS & GYNECOLOGY

## 2019-03-15 RX ORDER — HYDRALAZINE HYDROCHLORIDE 20 MG/ML
2.5-5 INJECTION INTRAMUSCULAR; INTRAVENOUS EVERY 10 MIN PRN
Status: DISCONTINUED | OUTPATIENT
Start: 2019-03-15 | End: 2019-03-15 | Stop reason: HOSPADM

## 2019-03-15 RX ORDER — ONDANSETRON 2 MG/ML
4 INJECTION INTRAMUSCULAR; INTRAVENOUS EVERY 30 MIN PRN
Status: DISCONTINUED | OUTPATIENT
Start: 2019-03-15 | End: 2019-03-15 | Stop reason: HOSPADM

## 2019-03-15 RX ORDER — OXYTOCIN/0.9 % SODIUM CHLORIDE 30/500 ML
PLASTIC BAG, INJECTION (ML) INTRAVENOUS
Status: COMPLETED
Start: 2019-03-15 | End: 2019-03-15

## 2019-03-15 RX ORDER — LABETALOL 20 MG/4 ML (5 MG/ML) INTRAVENOUS SYRINGE
10
Status: DISCONTINUED | OUTPATIENT
Start: 2019-03-15 | End: 2019-03-15 | Stop reason: HOSPADM

## 2019-03-15 RX ORDER — OXYTOCIN 10 [USP'U]/ML
10 INJECTION, SOLUTION INTRAMUSCULAR; INTRAVENOUS
Status: DISCONTINUED | OUTPATIENT
Start: 2019-03-15 | End: 2019-03-18 | Stop reason: HOSPADM

## 2019-03-15 RX ORDER — NALBUPHINE HYDROCHLORIDE 10 MG/ML
2.5-5 INJECTION, SOLUTION INTRAMUSCULAR; INTRAVENOUS; SUBCUTANEOUS EVERY 6 HOURS PRN
Status: DISCONTINUED | OUTPATIENT
Start: 2019-03-15 | End: 2019-03-18 | Stop reason: HOSPADM

## 2019-03-15 RX ORDER — LANOLIN 100 %
OINTMENT (GRAM) TOPICAL
Status: DISCONTINUED | OUTPATIENT
Start: 2019-03-15 | End: 2019-03-18 | Stop reason: HOSPADM

## 2019-03-15 RX ORDER — NALOXONE HYDROCHLORIDE 0.4 MG/ML
.1-.4 INJECTION, SOLUTION INTRAMUSCULAR; INTRAVENOUS; SUBCUTANEOUS
Status: DISCONTINUED | OUTPATIENT
Start: 2019-03-15 | End: 2019-03-18 | Stop reason: HOSPADM

## 2019-03-15 RX ORDER — FENTANYL CITRATE 50 UG/ML
INJECTION, SOLUTION INTRAMUSCULAR; INTRAVENOUS PRN
Status: DISCONTINUED | OUTPATIENT
Start: 2019-03-15 | End: 2019-03-15

## 2019-03-15 RX ORDER — OXYCODONE HYDROCHLORIDE 5 MG/1
5-10 TABLET ORAL
Status: DISCONTINUED | OUTPATIENT
Start: 2019-03-15 | End: 2019-03-18 | Stop reason: HOSPADM

## 2019-03-15 RX ORDER — LIDOCAINE 40 MG/G
CREAM TOPICAL
Status: DISCONTINUED | OUTPATIENT
Start: 2019-03-15 | End: 2019-03-18 | Stop reason: HOSPADM

## 2019-03-15 RX ORDER — GLYCOPYRROLATE 0.2 MG/ML
INJECTION, SOLUTION INTRAMUSCULAR; INTRAVENOUS PRN
Status: DISCONTINUED | OUTPATIENT
Start: 2019-03-15 | End: 2019-03-15

## 2019-03-15 RX ORDER — SIMETHICONE 80 MG
80 TABLET,CHEWABLE ORAL 4 TIMES DAILY PRN
Status: DISCONTINUED | OUTPATIENT
Start: 2019-03-15 | End: 2019-03-18 | Stop reason: HOSPADM

## 2019-03-15 RX ORDER — BUPIVACAINE HYDROCHLORIDE 7.5 MG/ML
INJECTION, SOLUTION INTRASPINAL PRN
Status: DISCONTINUED | OUTPATIENT
Start: 2019-03-15 | End: 2019-03-15

## 2019-03-15 RX ORDER — IBUPROFEN 800 MG/1
800 TABLET, FILM COATED ORAL EVERY 6 HOURS PRN
Status: DISCONTINUED | OUTPATIENT
Start: 2019-03-15 | End: 2019-03-18 | Stop reason: HOSPADM

## 2019-03-15 RX ORDER — MORPHINE SULFATE 1 MG/ML
INJECTION, SOLUTION EPIDURAL; INTRATHECAL; INTRAVENOUS PRN
Status: DISCONTINUED | OUTPATIENT
Start: 2019-03-15 | End: 2019-03-15

## 2019-03-15 RX ORDER — HYDROCORTISONE 2.5 %
CREAM (GRAM) TOPICAL 3 TIMES DAILY PRN
Status: DISCONTINUED | OUTPATIENT
Start: 2019-03-15 | End: 2019-03-18 | Stop reason: HOSPADM

## 2019-03-15 RX ORDER — ONDANSETRON 2 MG/ML
4 INJECTION INTRAMUSCULAR; INTRAVENOUS EVERY 6 HOURS PRN
Status: DISCONTINUED | OUTPATIENT
Start: 2019-03-15 | End: 2019-03-18 | Stop reason: HOSPADM

## 2019-03-15 RX ORDER — HYDROMORPHONE HYDROCHLORIDE 1 MG/ML
.3-.5 INJECTION, SOLUTION INTRAMUSCULAR; INTRAVENOUS; SUBCUTANEOUS EVERY 30 MIN PRN
Status: DISCONTINUED | OUTPATIENT
Start: 2019-03-15 | End: 2019-03-18 | Stop reason: HOSPADM

## 2019-03-15 RX ORDER — ACETAMINOPHEN 325 MG/1
650 TABLET ORAL EVERY 4 HOURS PRN
Status: DISCONTINUED | OUTPATIENT
Start: 2019-03-18 | End: 2019-03-18 | Stop reason: HOSPADM

## 2019-03-15 RX ORDER — OXYTOCIN/0.9 % SODIUM CHLORIDE 30/500 ML
340 PLASTIC BAG, INJECTION (ML) INTRAVENOUS CONTINUOUS PRN
Status: DISCONTINUED | OUTPATIENT
Start: 2019-03-15 | End: 2019-03-18 | Stop reason: HOSPADM

## 2019-03-15 RX ORDER — NALOXONE HYDROCHLORIDE 0.4 MG/ML
.1-.4 INJECTION, SOLUTION INTRAMUSCULAR; INTRAVENOUS; SUBCUTANEOUS
Status: ACTIVE | OUTPATIENT
Start: 2019-03-15 | End: 2019-03-16

## 2019-03-15 RX ORDER — KETOROLAC TROMETHAMINE 30 MG/ML
INJECTION, SOLUTION INTRAMUSCULAR; INTRAVENOUS PRN
Status: DISCONTINUED | OUTPATIENT
Start: 2019-03-15 | End: 2019-03-15

## 2019-03-15 RX ORDER — VALACYCLOVIR HYDROCHLORIDE 500 MG/1
500 TABLET, FILM COATED ORAL 2 TIMES DAILY
COMMUNITY
End: 2023-06-08

## 2019-03-15 RX ORDER — AMOXICILLIN 250 MG
1 CAPSULE ORAL 2 TIMES DAILY PRN
Status: DISCONTINUED | OUTPATIENT
Start: 2019-03-15 | End: 2019-03-18 | Stop reason: HOSPADM

## 2019-03-15 RX ORDER — DEXTROSE, SODIUM CHLORIDE, SODIUM LACTATE, POTASSIUM CHLORIDE, AND CALCIUM CHLORIDE 5; .6; .31; .03; .02 G/100ML; G/100ML; G/100ML; G/100ML; G/100ML
INJECTION, SOLUTION INTRAVENOUS CONTINUOUS
Status: DISCONTINUED | OUTPATIENT
Start: 2019-03-15 | End: 2019-03-18 | Stop reason: HOSPADM

## 2019-03-15 RX ORDER — ONDANSETRON 2 MG/ML
INJECTION INTRAMUSCULAR; INTRAVENOUS PRN
Status: DISCONTINUED | OUTPATIENT
Start: 2019-03-15 | End: 2019-03-15

## 2019-03-15 RX ORDER — CEFAZOLIN SODIUM 2 G/100ML
2 INJECTION, SOLUTION INTRAVENOUS
Status: COMPLETED | OUTPATIENT
Start: 2019-03-15 | End: 2019-03-15

## 2019-03-15 RX ORDER — EPHEDRINE SULFATE 50 MG/ML
5 INJECTION, SOLUTION INTRAMUSCULAR; INTRAVENOUS; SUBCUTANEOUS
Status: DISCONTINUED | OUTPATIENT
Start: 2019-03-15 | End: 2019-03-18 | Stop reason: HOSPADM

## 2019-03-15 RX ORDER — EPHEDRINE SULFATE 50 MG/ML
INJECTION, SOLUTION INTRAVENOUS PRN
Status: DISCONTINUED | OUTPATIENT
Start: 2019-03-15 | End: 2019-03-15

## 2019-03-15 RX ORDER — AMOXICILLIN 250 MG
2 CAPSULE ORAL 2 TIMES DAILY PRN
Status: DISCONTINUED | OUTPATIENT
Start: 2019-03-15 | End: 2019-03-18 | Stop reason: HOSPADM

## 2019-03-15 RX ORDER — CEFAZOLIN SODIUM 1 G/3ML
1 INJECTION, POWDER, FOR SOLUTION INTRAMUSCULAR; INTRAVENOUS SEE ADMIN INSTRUCTIONS
Status: DISCONTINUED | OUTPATIENT
Start: 2019-03-15 | End: 2019-03-15

## 2019-03-15 RX ORDER — PHENOL 1.4 %
10 AEROSOL, SPRAY (ML) MUCOUS MEMBRANE
COMMUNITY
End: 2022-09-08

## 2019-03-15 RX ORDER — OXYTOCIN/0.9 % SODIUM CHLORIDE 30/500 ML
100 PLASTIC BAG, INJECTION (ML) INTRAVENOUS CONTINUOUS
Status: DISCONTINUED | OUTPATIENT
Start: 2019-03-15 | End: 2019-03-18 | Stop reason: HOSPADM

## 2019-03-15 RX ORDER — BISACODYL 10 MG
10 SUPPOSITORY, RECTAL RECTAL DAILY PRN
Status: DISCONTINUED | OUTPATIENT
Start: 2019-03-17 | End: 2019-03-18 | Stop reason: HOSPADM

## 2019-03-15 RX ORDER — OXYTOCIN/0.9 % SODIUM CHLORIDE 30/500 ML
PLASTIC BAG, INJECTION (ML) INTRAVENOUS PRN
Status: DISCONTINUED | OUTPATIENT
Start: 2019-03-15 | End: 2019-03-15

## 2019-03-15 RX ORDER — PRENATAL VIT/IRON FUM/FOLIC AC 27MG-0.8MG
1 TABLET ORAL AT BEDTIME
Status: DISCONTINUED | OUTPATIENT
Start: 2019-03-15 | End: 2019-03-18 | Stop reason: HOSPADM

## 2019-03-15 RX ORDER — KETOROLAC TROMETHAMINE 30 MG/ML
30 INJECTION, SOLUTION INTRAMUSCULAR; INTRAVENOUS EVERY 6 HOURS
Status: DISPENSED | OUTPATIENT
Start: 2019-03-15 | End: 2019-03-16

## 2019-03-15 RX ORDER — SODIUM CHLORIDE, SODIUM LACTATE, POTASSIUM CHLORIDE, CALCIUM CHLORIDE 600; 310; 30; 20 MG/100ML; MG/100ML; MG/100ML; MG/100ML
INJECTION, SOLUTION INTRAVENOUS CONTINUOUS
Status: DISCONTINUED | OUTPATIENT
Start: 2019-03-15 | End: 2019-03-15

## 2019-03-15 RX ORDER — CITRIC ACID/SODIUM CITRATE 334-500MG
30 SOLUTION, ORAL ORAL
Status: COMPLETED | OUTPATIENT
Start: 2019-03-15 | End: 2019-03-15

## 2019-03-15 RX ORDER — ONDANSETRON 4 MG/1
4 TABLET, ORALLY DISINTEGRATING ORAL EVERY 30 MIN PRN
Status: DISCONTINUED | OUTPATIENT
Start: 2019-03-15 | End: 2019-03-15 | Stop reason: HOSPADM

## 2019-03-15 RX ORDER — MORPHINE SULFATE 0.5 MG/ML
0.2 INJECTION, SOLUTION EPIDURAL; INTRATHECAL; INTRAVENOUS ONCE
Status: DISCONTINUED | OUTPATIENT
Start: 2019-03-15 | End: 2019-03-18 | Stop reason: HOSPADM

## 2019-03-15 RX ORDER — CITRIC ACID/SODIUM CITRATE 334-500MG
30 SOLUTION, ORAL ORAL ONCE
Status: DISCONTINUED | OUTPATIENT
Start: 2019-03-15 | End: 2019-03-18 | Stop reason: HOSPADM

## 2019-03-15 RX ORDER — ONDANSETRON 4 MG/1
4 TABLET, ORALLY DISINTEGRATING ORAL EVERY 6 HOURS PRN
Status: DISCONTINUED | OUTPATIENT
Start: 2019-03-15 | End: 2019-03-18 | Stop reason: HOSPADM

## 2019-03-15 RX ORDER — SODIUM CHLORIDE, SODIUM LACTATE, POTASSIUM CHLORIDE, CALCIUM CHLORIDE 600; 310; 30; 20 MG/100ML; MG/100ML; MG/100ML; MG/100ML
INJECTION, SOLUTION INTRAVENOUS CONTINUOUS
Status: DISCONTINUED | OUTPATIENT
Start: 2019-03-15 | End: 2019-03-15 | Stop reason: HOSPADM

## 2019-03-15 RX ORDER — ACETAMINOPHEN 325 MG/1
975 TABLET ORAL EVERY 8 HOURS
Status: COMPLETED | OUTPATIENT
Start: 2019-03-15 | End: 2019-03-18

## 2019-03-15 RX ADMIN — SODIUM CHLORIDE, POTASSIUM CHLORIDE, SODIUM LACTATE AND CALCIUM CHLORIDE: 600; 310; 30; 20 INJECTION, SOLUTION INTRAVENOUS at 12:00

## 2019-03-15 RX ADMIN — Medication 0.2 MG: at 12:06

## 2019-03-15 RX ADMIN — OXYCODONE HYDROCHLORIDE 5 MG: 5 TABLET ORAL at 15:53

## 2019-03-15 RX ADMIN — OXYTOCIN-SODIUM CHLORIDE 0.9% IV SOLN 30 UNIT/500ML 275 ML: 30-0.9/5 SOLUTION at 13:07

## 2019-03-15 RX ADMIN — BUPIVACAINE HYDROCHLORIDE IN DEXTROSE 15 MG: 7.5 INJECTION, SOLUTION SUBARACHNOID at 12:06

## 2019-03-15 RX ADMIN — CEFAZOLIN SODIUM 2 G: 2 INJECTION, SOLUTION INTRAVENOUS at 11:58

## 2019-03-15 RX ADMIN — OXYCODONE HYDROCHLORIDE 5 MG: 5 TABLET ORAL at 15:07

## 2019-03-15 RX ADMIN — SODIUM CHLORIDE, POTASSIUM CHLORIDE, SODIUM LACTATE AND CALCIUM CHLORIDE: 600; 310; 30; 20 INJECTION, SOLUTION INTRAVENOUS at 13:00

## 2019-03-15 RX ADMIN — SODIUM CITRATE AND CITRIC ACID MONOHYDRATE 30 ML: 500; 334 SOLUTION ORAL at 11:38

## 2019-03-15 RX ADMIN — ACETAMINOPHEN 975 MG: 325 TABLET, FILM COATED ORAL at 22:05

## 2019-03-15 RX ADMIN — PHENYLEPHRINE HYDROCHLORIDE 100 MCG: 10 INJECTION, SOLUTION INTRAMUSCULAR; INTRAVENOUS; SUBCUTANEOUS at 12:07

## 2019-03-15 RX ADMIN — SODIUM CHLORIDE, SODIUM LACTATE, POTASSIUM CHLORIDE, CALCIUM CHLORIDE AND DEXTROSE MONOHYDRATE: 5; 600; 310; 30; 20 INJECTION, SOLUTION INTRAVENOUS at 21:10

## 2019-03-15 RX ADMIN — KETOROLAC TROMETHAMINE 30 MG: 30 INJECTION, SOLUTION INTRAMUSCULAR at 12:55

## 2019-03-15 RX ADMIN — SODIUM CHLORIDE, POTASSIUM CHLORIDE, SODIUM LACTATE AND CALCIUM CHLORIDE 1000 ML: 600; 310; 30; 20 INJECTION, SOLUTION INTRAVENOUS at 10:52

## 2019-03-15 RX ADMIN — FENTANYL CITRATE 100 MCG: 50 INJECTION, SOLUTION INTRAMUSCULAR; INTRAVENOUS at 12:37

## 2019-03-15 RX ADMIN — ONDANSETRON 4 MG: 2 INJECTION INTRAMUSCULAR; INTRAVENOUS at 12:15

## 2019-03-15 RX ADMIN — SENNOSIDES AND DOCUSATE SODIUM 1 TABLET: 8.6; 5 TABLET ORAL at 21:10

## 2019-03-15 RX ADMIN — GLYCOPYRROLATE 0.2 MG: 0.2 INJECTION, SOLUTION INTRAMUSCULAR; INTRAVENOUS at 12:06

## 2019-03-15 RX ADMIN — OXYCODONE HYDROCHLORIDE 5 MG: 5 TABLET ORAL at 18:35

## 2019-03-15 RX ADMIN — KETOROLAC TROMETHAMINE 30 MG: 30 INJECTION, SOLUTION INTRAMUSCULAR; INTRAVENOUS at 18:35

## 2019-03-15 RX ADMIN — NALBUPHINE HYDROCHLORIDE 5 MG: 10 INJECTION, SOLUTION INTRAMUSCULAR; INTRAVENOUS; SUBCUTANEOUS at 13:57

## 2019-03-15 RX ADMIN — ACETAMINOPHEN 975 MG: 325 TABLET, FILM COATED ORAL at 14:31

## 2019-03-15 RX ADMIN — EPHEDRINE SULFATE 10 MG: 50 INJECTION, SOLUTION INTRAVENOUS at 12:07

## 2019-03-15 RX ADMIN — OXYTOCIN-SODIUM CHLORIDE 0.9% IV SOLN 30 UNIT/500ML 100 ML/HR: 30-0.9/5 SOLUTION at 15:53

## 2019-03-15 RX ADMIN — OXYCODONE HYDROCHLORIDE 5 MG: 5 TABLET ORAL at 22:05

## 2019-03-15 RX ADMIN — SODIUM CHLORIDE, POTASSIUM CHLORIDE, SODIUM LACTATE AND CALCIUM CHLORIDE: 600; 310; 30; 20 INJECTION, SOLUTION INTRAVENOUS at 11:58

## 2019-03-15 RX ADMIN — PRENATAL VIT W/ FE FUMARATE-FA TAB 27-0.8 MG 1 TABLET: 27-0.8 TAB at 22:05

## 2019-03-15 RX ADMIN — PHENYLEPHRINE HYDROCHLORIDE 100 MCG: 10 INJECTION, SOLUTION INTRAMUSCULAR; INTRAVENOUS; SUBCUTANEOUS at 12:16

## 2019-03-15 NOTE — ANESTHESIA PROCEDURE NOTES
Peripheral nerve/Neuraxial procedure note : intrathecal  Pre-Procedure  Performed by Landry West MD  Referred by LALO SAENZ  Location: OB, OR      Pre-Anesthestic Checklist: patient identified, IV checked, risks and benefits discussed, informed consent, monitors and equipment checked, pre-op evaluation and at physician/surgeon's request    Timeout  Correct Patient: Yes   Correct Procedure: Yes   Correct Site: Yes   Correct Laterality: N/A   Correct Position: Yes   Site Marked: N/A   .   Procedure Documentation  ASA 2  Diagnosis:repeat ccs.    Procedure:    Intrathecal.  Insertion Site:L2-3  (midline approach)      Patient Prep;mask, sterile gloves, povidone-iodine 7.5% surgical scrub, patient draped.  .  Needle: Sprotte Spinal Needle (gauge): 24  Spinal/LP Needle Length (inches): 3.5 # of attempts: 1 and # of redirects:  Introducer used .       Assessment/Narrative  Paresthesias: No.  .  .  0.01 mL of clear CSF fluid removed . Comments:  0.2 duramorph and 2cc .75marc.

## 2019-03-15 NOTE — ANESTHESIA PREPROCEDURE EVALUATION
Anesthesia Pre-Procedure Evaluation    Patient: Leah Do   MRN: [id number] : 1985          Preoperative Diagnosis: previous , chronic hypertension and LFT- PT NOT 39 WKS WILL BE 38 WKS PER HYPERTENSION AND LFT    Procedure(s):  REPEAT  SECTION    Past Medical History:   Diagnosis Date     Numbness and tingling     [place] numbness and tingling to legs/ft     Other chronic pain     back     Past Surgical History:   Procedure Laterality Date     ABDOMEN SURGERY      c section     BACK SURGERY      lumbar spinal fusion     C/SECTION, CLASSICAL       DILATION AND CURETTAGE SUCTION  3/7/2013    Procedure: DILATION AND CURETTAGE SUCTION;  DILATION AND CURETTAGE SUCTION;  Surgeon: Kya Goss MD;  Location: [facility] OR     EXPLORE SPINE, REMOVE HARDWARE, COMBINED  2014    Procedure: COMBINED EXPLORE SPINE, REMOVE HARDWARE;;  Surgeon: Christopher Delgado MD;  Location: [facility] OR     FUSION LUMBAR ANTERIOR TWO LEVELS  2014    Procedure: FUSION LUMBAR ANTERIOR TWO LEVELS;  ANTERIOR LUMBAR FUSION AT L4-5 AND L5-S1 USING AN INTERVERTEBRAL PROSTHESIS AND INFUSE , HARDWARE REMOVAL AND REVISION DECOMPRESSION L4-S-1, POSTERIOR SPINAL FUSION L4-5-S1 USING DEMINERALIZATION BONE MATTRIX AND INSTRUMENTATION (REMOVING  HARDWARE (ARVIND); POSTERIOR HARDWARE REVERE INSTRUMENTATION) CONTINENTAL INTERVERTEBRAL PROSTHESIS FROM BLANCA     GYN SURGERY      d and c for theraputic      LAMINECTOMY, FUSION LUMBAR TWO LEVELS, COMBINED  2014    Procedure: COMBINED LAMINECTOMY, FUSION LUMBAR TWO LEVELS;;  Surgeon: Christopher Delgado MD;  Location: [facility] OR     wisdom teeth[       Anesthesia Evaluation     . Pt has had prior anesthetic.            ROS/MED HX    ENT/Pulmonary:  - neg pulmonary ROS     Neurologic:  - neg neurologic ROS   (+)other neuro b/l leg tingling    Cardiovascular:  - neg cardiovascular ROS       METS/Exercise Tolerance:     Hematologic:  - neg hematologic  ROS       Musculoskeletal:   "- neg musculoskeletal ROS       GI/Hepatic:     (+) Other GI/Hepatic elev LFT's in pregnancy      Renal/Genitourinary:         Endo:     (+) Obesity, .      Psychiatric:         Infectious Disease:  - neg infectious disease ROS       Malignancy:         Other:    (+) Possibly pregnant H/O Chronic Pain,                        Physical Exam  Normal systems: cardiovascular and pulmonary    Airway   Mallampati: II  TM distance: >3 FB  Neck ROM: full    Dental     Cardiovascular       Pulmonary             Lab Results   Component Value Date    WBC 10.1 04/14/2011    HGB 12.4 03/14/2019    HCT 28.9 (L) 04/14/2011     04/14/2011     04/09/2014    POTASSIUM 4.0 04/09/2014    CHLORIDE 104 04/09/2014    CO2 30 04/09/2014    BUN 4 (L) 04/09/2014    CR 0.61 04/09/2014     (H) 04/09/2014    YANY 7.8 (L) 04/09/2014    TSH 1.840 08/31/2018    HCG Negative 04/07/2014       Preop Vitals  BP Readings from Last 3 Encounters:   03/15/19 132/83   09/22/18 (!) 144/91   09/10/18 108/70    Pulse Readings from Last 3 Encounters:   09/22/18 71   09/10/18 64   04/09/14 94      Resp Readings from Last 3 Encounters:   03/15/19 18   09/22/18 20   09/10/18 20    SpO2 Readings from Last 3 Encounters:   09/22/18 100%   04/10/14 94%   03/07/13 100%      Temp Readings from Last 1 Encounters:   03/15/19 98.2  F (36.8  C) (Oral)    Ht Readings from Last 1 Encounters:   03/11/19 1.626 m (5' 4\")      Wt Readings from Last 1 Encounters:   03/11/19 110.7 kg (244 lb)    Estimated body mass index is 41.88 kg/m  as calculated from the following:    Height as of this encounter: 1.626 m (5' 4\").    Weight as of this encounter: 110.7 kg (244 lb).       Anesthesia Plan      History & Physical Review  History and physical reviewed and following examination; no interval change.    ASA Status:  2 .    NPO Status:  > 8 hours    Plan for Spinal     SAB w/ duramorph for POP, GETA as backup      Postoperative Care  Postoperative pain management:  " Neuraxial analgesia.      Consents  Anesthetic plan, risks, benefits and alternatives discussed with:  Patient.  Use of blood products discussed: Yes.   Use of blood products discussed with Patient.  Consented to blood products.  .                 Landry West MD                    .

## 2019-03-15 NOTE — PLAN OF CARE
Data: [facility] presented to [facility]: 3/15/2019 10:00 AM.  Patient admitted for scheduled repeat  section. Patient is a .  Prenatal record reviewed. Pregnancy has been uncomplicated..  Gestational Age 38w0d. VSS. Fetal movement present. Patient denies uterine contractions, leaking of vaginal fluid/rupture of membranes, vaginal bleeding, abdominal pain, pelvic pressure, nausea, vomiting, headache, visual disturbances, epigastric or URQ pain, significant edema. Support person is present.   Action: Verbal consent for EFM.  Admission assessment completed. Bill of rights reviewed.  Response: Patient verbalized agreement with plan. Will contact Dr Nikkie Goss with update and further orders.    Pre-operative orders released and initiated by RN. POC reviewed with pt and SO, Mina--all questions answered.

## 2019-03-15 NOTE — ANESTHESIA CARE TRANSFER NOTE
Patient: Leah Do    Procedure(s):  REPEAT  SECTION    Diagnosis: previous , chronic hypertension and LFT- PT NOT 39 WKS WILL BE 38 WKS PER HYPERTENSION AND LFT  Diagnosis Additional Information: No value filed.    Anesthesia Type:   Spinal     Note:  Airway :Room Air  Patient transferred to:Labor and Delivery  Comments: Pt awake vss exchanging well report to rnHandoff Report: Identifed the Patient, Identified the Reponsible Provider, Reviewed the pertinent medical history, Discussed the surgical course, Reviewed Intra-OP anesthesia mangement and issues during anesthesia, Set expectations for post-procedure period and Allowed opportunity for questions and acknowledgement of understanding      Vitals: (Last set prior to Anesthesia Care Transfer)    CRNA VITALS  3/15/2019 1238 - 3/15/2019 1313      3/15/2019             SpO2:  95 %                Electronically Signed By: CINTHYA Higgins CRNA  March 15, 2019  1:13 PM

## 2019-03-15 NOTE — BRIEF OP NOTE
[facility] Brief Operative Note    Pre-operative diagnosis: 38 weeek gestation, previous , chronic hypertension, elevated LFTs   Post-operative diagnosis Same   Procedure: Procedure(s):  REPEAT  SECTION   Surgeon(s): Surgeon(s) and Role:     * Nikkie Goss MD - Primary   Estimated blood loss: 600 cc   Specimens: ID Type Source Tests Collected by Time Destination   1 :  Cord blood Blood OR DOCUMENTATION ONLY Nikkie Goss MD 3/15/2019 12:33 PM       Findings: Liveborn female, 6#13, 9/9 Apgars

## 2019-03-15 NOTE — ANESTHESIA POSTPROCEDURE EVALUATION
Patient: Leah Do    Procedure(s):  REPEAT  SECTION    Diagnosis:previous , chronic hypertension and LFT- PT NOT 39 WKS WILL BE 38 WKS PER HYPERTENSION AND LFT  Diagnosis Additional Information: Prev cs   R Cho    Anesthesia Type:  Spinal    Note:  Anesthesia Post Evaluation    Patient location during evaluation: PACU  Patient participation: Able to fully participate in evaluation  Level of consciousness: awake  Pain management: adequate  Airway patency: patent  Cardiovascular status: acceptable  Respiratory status: acceptable  Hydration status: acceptable  PONV: none     Anesthetic complications: None          Last vitals:  Vitals:    03/15/19 1011 03/15/19 1030   BP: 137/87 132/83   Resp: 18    Temp: 98.2  F (36.8  C)          Electronically Signed By: Landry West MD  March 15, 2019  1:18 PM

## 2019-03-15 NOTE — PLAN OF CARE
Data: Leah Do transferred to 423 via cart at 1450. Baby transferred via parent's arms.  Action: Receiving unit notified of transfer: Yes. Patient and family notified of room change. Report given to JOSE Almendarez at 1520. Belongings sent to receiving unit. Accompanied by Registered Nurse. Oriented patient to surroundings. Call light within reach. ID bands double-checked with receiving RN.  Response: Patient tolerated transfer and is stable.

## 2019-03-16 LAB — HGB BLD-MCNC: 10.1 G/DL (ref 11.7–15.7)

## 2019-03-16 PROCEDURE — 36415 COLL VENOUS BLD VENIPUNCTURE: CPT | Performed by: OBSTETRICS & GYNECOLOGY

## 2019-03-16 PROCEDURE — 12000000 ZZH R&B MED SURG/OB

## 2019-03-16 PROCEDURE — 25000128 H RX IP 250 OP 636: Performed by: OBSTETRICS & GYNECOLOGY

## 2019-03-16 PROCEDURE — 40000086 ZZH STATISTIC IP LACTATION SERVICES 46-60 MIN

## 2019-03-16 PROCEDURE — 25000128 H RX IP 250 OP 636: Performed by: ANESTHESIOLOGY

## 2019-03-16 PROCEDURE — 85018 HEMOGLOBIN: CPT | Performed by: OBSTETRICS & GYNECOLOGY

## 2019-03-16 PROCEDURE — 25000132 ZZH RX MED GY IP 250 OP 250 PS 637: Performed by: OBSTETRICS & GYNECOLOGY

## 2019-03-16 RX ADMIN — ACETAMINOPHEN 975 MG: 325 TABLET, FILM COATED ORAL at 06:18

## 2019-03-16 RX ADMIN — IBUPROFEN 800 MG: 800 TABLET ORAL at 19:06

## 2019-03-16 RX ADMIN — NALBUPHINE HYDROCHLORIDE 5 MG: 10 INJECTION, SOLUTION INTRAMUSCULAR; INTRAVENOUS; SUBCUTANEOUS at 01:54

## 2019-03-16 RX ADMIN — OXYCODONE HYDROCHLORIDE 5 MG: 5 TABLET ORAL at 08:05

## 2019-03-16 RX ADMIN — OXYCODONE HYDROCHLORIDE 5 MG: 5 TABLET ORAL at 05:04

## 2019-03-16 RX ADMIN — ACETAMINOPHEN 975 MG: 325 TABLET, FILM COATED ORAL at 21:55

## 2019-03-16 RX ADMIN — SENNOSIDES AND DOCUSATE SODIUM 2 TABLET: 8.6; 5 TABLET ORAL at 21:55

## 2019-03-16 RX ADMIN — PRENATAL VIT W/ FE FUMARATE-FA TAB 27-0.8 MG 1 TABLET: 27-0.8 TAB at 21:57

## 2019-03-16 RX ADMIN — IBUPROFEN 800 MG: 800 TABLET ORAL at 12:43

## 2019-03-16 RX ADMIN — KETOROLAC TROMETHAMINE 30 MG: 30 INJECTION, SOLUTION INTRAMUSCULAR; INTRAVENOUS at 00:30

## 2019-03-16 RX ADMIN — SENNOSIDES AND DOCUSATE SODIUM 1 TABLET: 8.6; 5 TABLET ORAL at 14:20

## 2019-03-16 RX ADMIN — KETOROLAC TROMETHAMINE 30 MG: 30 INJECTION, SOLUTION INTRAMUSCULAR; INTRAVENOUS at 06:18

## 2019-03-16 RX ADMIN — ACETAMINOPHEN 975 MG: 325 TABLET, FILM COATED ORAL at 14:20

## 2019-03-16 RX ADMIN — OXYCODONE HYDROCHLORIDE 5 MG: 5 TABLET ORAL at 01:36

## 2019-03-16 NOTE — PLAN OF CARE
Stable patient meeting expected goals. Pain being managed with ibuprofen, tylenol, and oxycodone. No drainage noted near incision. Adequate urine output, saline lock removed. Up and ambulating in hallway. Breastfeeding infant using nipple shield.

## 2019-03-16 NOTE — PLAN OF CARE
Tegaderm removed . Steri strips applied on incision  using sterile gloves .  New Tegaderm applied . 2 abd placed with foam tape used,  per Dr. Sullivan's order.   Pt is dangled and  ambulated to bathroom .  Alea care done.  No nausea/ no vomiting.

## 2019-03-16 NOTE — PROGRESS NOTES
[facility]   Obstetrics Progress Note    Subjective: This is the patient's first day since  delivery. She is doing well.  She is urinating on her own. Pain is controlled with medication.    Objective:   All vitals stable  Temp: 98  F (36.7  C) Temp src: Oral BP: 96/54     Resp: 16 SpO2: 98 % O2 Device: None (Room air)      EXAM:  Constitutional: healthy, alert, no distress.   Abdomen: Abdomen soft, non-tender. BS normal. No masses, fundus is firm.  JOINT/EXTREMITIES: extremities normal   Incision: foam tape and pressure dressing present    Last hemoglobin was   Hemoglobin   Date Value Ref Range Status   2019 10.1 (L) 11.7 - 15.7 g/dL Final   ]    Assessment: Stable postpartum course.    Plan: Routine care. Ambulation encouraged  Breast feeding strategies discussed  Pain control measures as needed  Reportable signs and symptoms dicussed with the patient  Anticipate discharge in 2 days    Fariba Bernal

## 2019-03-16 NOTE — PLAN OF CARE
Patient vital signs stable and meeting expected outcomes.  Breastfeeding well with nipple shield some assistance from staff with positioning and achieving good latch.  Ricci catheter draining adequate output, removed at 0630.  Pt was instructed to call when she needs to get up to the bathroom.  Pain well controlled with tylenol, toradol, and oxycodone.  IV saline locked.  Incision WDL.  Able to perform all cares for self and infant with some help.  Significant other present and supportive at bedside.  Bonding well with baby.  Will continue to monitor.

## 2019-03-16 NOTE — PLAN OF CARE
Pt is up and ambulating in the room. Has sore nipple, nipple cream at bedside.  Taking Inuporfen/tylenol for pain. FOB here, supportive.

## 2019-03-16 NOTE — OP NOTE
Procedure Date: 03/15/2019      PREOPERATIVE DIAGNOSES:  A 38-week gestation, previous  section, chronic hypertension, elevated liver function tests.      POSTOPERATIVE DIAGNOSES:  A 38-week gestation, previous  section, chronic hypertension, elevated liver function tests.      PROCEDURE:  Repeat low transverse  section.      SURGEON:  Nikkie Goss MD      ANESTHESIA:  Spinal.      QUANTITATIVE BLOOD LOSS:  600 mL.      FINDINGS:  Liveborn female infant.  Weight was 6 pounds 13 ounces.  Apgars were 9 and 9 at 1 and 5 minutes respectively.      HISTORY:  The patient is a 33-year-old  4, para 1 who presents at 38 weeks' gestation for repeat  section.  Her pregnancy had been complicated by chronic hypertension with elevated LFTs for several weeks, which gradually resolved.  It was not suspected that she had HELLP syndrome.  However, given the unusual finding as well as history of chronic hypertension, it was decided to proceed with  section at 38 weeks.  Risks, benefits and alternatives were discussed.  Nonstress test was reactive on presentation.      DESCRIPTION OF PROCEDURE:  After obtaining informed consent, the patient was taken to the operating room, where she was prepped and draped in the usual sterile fashion and placed in the dorsal supine position with a leftward tilt.  A Pfannenstiel skin incision was made through the prior scar.  Cautery was used to dissect down to fascia.  She did have a thick subcutaneous layer measuring at least 6 cm.  Cautery was used to enter the fascia, and Gonzalez scissors were used to continue the incision laterally.  The rectus muscles were cauterized to separate them from the fascia, and the rectus muscles were divided manually, and the peritoneum was entered sharply.  The peritoneal incision was extended with sharp dissection.  At this time, the large Gualberto ring retractor was introduced.  The bladder blade was placed.  There was some  scarring of the bladder flap.  The vesicouterine peritoneum was entered with Metzenbaum scissors, and a bladder flap was created.  A transverse lower uterine segment smile incision was made with a scalpel until clear amniotic fluid was noted.  The uterine incision was gently extended using traction in the cephalocaudal directions.  The fetal vertex was elevated out of the pelvis and delivered through the incision using gentle fundal pressure.  There was a tight nuchal cord that was reduced on the abdomen.  The anterior shoulder delivered easily, the remainder of the body following spontaneously.  The infant was immediately vigorous and crying.  Cord clamping was delayed by 1 minute, at which time the cord was doubly clamped and cut, and the infant was transferred to the warmer.  The placenta was expressed and found to be intact with a 3-vessel cord.  The uterus was cleared of all clots and debris.  The uterine incision was reapproximated with 0 Vicryl in a continuous locked fashion.  A second imbricating layer was placed with 0 Vicryl.  The ovaries and tubes were examined and noted to be normal-appearing.  The gutters were cleared of all clots and debris.  The Gualberto retractor was then removed.  The fascia was closed with 0 Vicryl in 2 running lengths.  Due to the depth of the subcutaneous layer, 2 layers with 3-0 Vicryl were placed to close this layer.  The skin was then closed with Insorb staples.  The patient tolerated the procedure without difficulty, and she was taken to the recovery room in stable condition.  Sponge, lap and needle counts were correct.         JOSE ANGEL SANEZ MD             D: 03/15/2019   T: 2019   MT: ELYSIA      Name:     TUSHAR RODRIGUEZ   MRN:      [id number]        Account:        [id number]   :      1985           Procedure Date: 03/15/2019      Document: [id number]

## 2019-03-17 PROCEDURE — 12000000 ZZH R&B MED SURG/OB

## 2019-03-17 PROCEDURE — 25000132 ZZH RX MED GY IP 250 OP 250 PS 637: Performed by: OBSTETRICS & GYNECOLOGY

## 2019-03-17 RX ADMIN — ACETAMINOPHEN 975 MG: 325 TABLET, FILM COATED ORAL at 06:32

## 2019-03-17 RX ADMIN — IBUPROFEN 800 MG: 800 TABLET ORAL at 06:32

## 2019-03-17 RX ADMIN — SENNOSIDES AND DOCUSATE SODIUM 2 TABLET: 8.6; 5 TABLET ORAL at 18:45

## 2019-03-17 RX ADMIN — ACETAMINOPHEN 975 MG: 325 TABLET, FILM COATED ORAL at 22:13

## 2019-03-17 RX ADMIN — IBUPROFEN 800 MG: 800 TABLET ORAL at 00:50

## 2019-03-17 RX ADMIN — PRENATAL VIT W/ FE FUMARATE-FA TAB 27-0.8 MG 1 TABLET: 27-0.8 TAB at 22:13

## 2019-03-17 RX ADMIN — ACETAMINOPHEN 975 MG: 325 TABLET, FILM COATED ORAL at 14:30

## 2019-03-17 RX ADMIN — SENNOSIDES AND DOCUSATE SODIUM 2 TABLET: 8.6; 5 TABLET ORAL at 10:03

## 2019-03-17 RX ADMIN — IBUPROFEN 800 MG: 800 TABLET ORAL at 12:47

## 2019-03-17 RX ADMIN — IBUPROFEN 800 MG: 800 TABLET ORAL at 18:46

## 2019-03-17 RX ADMIN — BISACODYL 10 MG: 10 SUPPOSITORY RECTAL at 22:48

## 2019-03-17 NOTE — LACTATION NOTE
Lactation in to see patient. Parents state baby usually sleeping at breast. Baby latched with few sucks. Baby latch not deep, lips not making contact with skin, using medium shield. Switched to small shield, better latch observed. Discussed baby needing to get deep on breast. Baby sleepy, even with breast compressions baby barley sucked and no swallows heard. Writer tried many times to get baby up. Baby latched sucked a minute or two then sleeps. Discussed need to pump. Weight at 8.2% at day one. Writer suggested pumping and supplementing. Consent signed for doner milk, supplemented baby via sns. Baby active at breast, breast compressions done while baby nursing. Patient getting drops with pumping, and has already been hand expressing into spoon to give baby. Parents understanding of plan. Notified of needing a doctor prescription if wanting only to use doner milk. Patient stated she is on a blog and can get milk from an unknown mother. Writer suggested against this, as it is not tested or regulated and persons are strangers.  All questions answered. Encouraged to call for assistance.

## 2019-03-17 NOTE — PLAN OF CARE
Meeting goals for shift, see flow sheet. C/O of a stinging discomfort on left side of incision. IBU and tylenol for comfort, patient declines oxycodone or ice. Caring for self and infant in room and bonding well. Pumping after breast feeding. Anticipate discharge tomorrow.

## 2019-03-17 NOTE — PROGRESS NOTES
Doing well except notes sharp pain along the left side of the incision while sitting up right.    vss afebrile  Abdomen soft and nt  Fundus firm and nt  Incision dry and intact with Tegaderm + steri strips. No erythema/drainage noted.  Extremities nl    Hgb= 10.1    A: POD 2 s/p repeat c/s at 38 weeks due to elevated BP and h/o elevated LFTs  antepartum (BPs have been WNL since delivery)      No explanation for the left sided occasional sharp pains. Will monitor. Precautions reviewed    P: Routine post op care       Discharge home tomorrow

## 2019-03-17 NOTE — PLAN OF CARE
Patient vital signs stable and meeting expected outcomes.  Breastfeeding well with nipple shield with minimal assistance from staff.  Using SNS system with donor milk when feeding.  Up independently and voiding adequately.  Pain well controlled with tylenol and ibuprofen.  Incision on middle-right side looks like skin is either folded or is  slightly.  New steri strips and tegaderm applied, no drainage noted.  Able to perform all cares for self and infant.  Bonding well with baby.  FOB present and supportive at bedside.  Will continue to monitor.

## 2019-03-18 VITALS
OXYGEN SATURATION: 98 % | TEMPERATURE: 98.1 F | RESPIRATION RATE: 18 BRPM | DIASTOLIC BLOOD PRESSURE: 79 MMHG | WEIGHT: 244 LBS | HEIGHT: 64 IN | SYSTOLIC BLOOD PRESSURE: 133 MMHG | BODY MASS INDEX: 41.66 KG/M2

## 2019-03-18 PROCEDURE — 25000132 ZZH RX MED GY IP 250 OP 250 PS 637: Performed by: OBSTETRICS & GYNECOLOGY

## 2019-03-18 RX ORDER — OXYCODONE HYDROCHLORIDE 5 MG/1
5 TABLET ORAL EVERY 6 HOURS PRN
Qty: 6 TABLET | Refills: 0 | Status: SHIPPED | OUTPATIENT
Start: 2019-03-18 | End: 2022-09-08

## 2019-03-18 RX ADMIN — IBUPROFEN 800 MG: 800 TABLET ORAL at 01:11

## 2019-03-18 RX ADMIN — ACETAMINOPHEN 650 MG: 325 TABLET, FILM COATED ORAL at 14:37

## 2019-03-18 RX ADMIN — IBUPROFEN 800 MG: 800 TABLET ORAL at 12:59

## 2019-03-18 RX ADMIN — ACETAMINOPHEN 975 MG: 325 TABLET, FILM COATED ORAL at 06:32

## 2019-03-18 RX ADMIN — SENNOSIDES AND DOCUSATE SODIUM 2 TABLET: 8.6; 5 TABLET ORAL at 09:43

## 2019-03-18 RX ADMIN — IBUPROFEN 800 MG: 800 TABLET ORAL at 06:33

## 2019-03-18 NOTE — PLAN OF CARE
Meeting goals for shift and discharge to home, see flow sheet. IBU, tylenol and abdominal binder for comfort. Declined oxycodone, does have printed RX to fill at own pharmacy. OTC IBU, tylenol, and stool softener. Patient is aware when to remove tegaderm and steri-strips. Small BM, patient is pumping after feedings. Caring for self and infant in room with spouse who is supportive.AVS/DC teaching and medications reviewed with patient, and spouse. Patient is aware of when to call and follow-up. Patient is aware of resources available.  Aware of [place] scale and when to retake and call.Teaching is completed. No questions.

## 2019-03-18 NOTE — LACTATION NOTE
"This note was copied from a baby's chart.  Lactation consult and plan for discharge feeds.  LC noted that infant was very \"noisy\" with SNS.  She was primarily drawing off the SNS and was not stimulating mother's milk well.  New feeding plan implemented for discharge.    1-Nurse infant every 2-3 hours and on demand.    2-Awaken her to nurse even if sleepy by 3 hours.  3-Offer both breasts with each feeding, may need to switch feeds to keep her awake and engaged during nursing.  (ie: nurse right side for 5 minutes, left for 5 minutes, right again, left again for a total of 20-30 minutes of nursing).  4-Use the nipple shield for now to help her move into an active feeding pattern.  May begin to wean off the shield as she is able to hold the nipple everted.   5-Pump and hand express for 15 minutes after nursing while she is supplemented with expressed breastmilk or formula.    6-Offer supplements after each feeding until follow up Peds visit.  May offer up to 30 mL by finger feeding or bottle if preferred.  7-Consider use of herbals to help boost milk production.  Information provided.  8-Call with any questions. [phone].  9-LC will also provide a follow up phone call due to nipple shield use and difficulties nursing while in the hospital.  "

## 2019-03-18 NOTE — PLAN OF CARE
Pt is up and ambulating on hallway . + BS, + flatus, NO BM yet. Pt states she has urge to have bowel movement but could not.  Encouraged more ambulation, increase fluid. 2 senna given and offered  suppository but pt declines at this time.  Birth certificate done. Depression score = 0 . ROP needs to be notarized. Pt watch Shaken baby syndrome and both parents been watching on  channel.

## 2019-03-18 NOTE — PROGRESS NOTES
Public Health Nurse (PHN) met with patient, discussed resources within [facility].  Provided family resources of [facility] services resource card, home visiting card, community resource guide and car seat information card given and discussed.  Family is aware how to add baby to insurance and have a primary provider arranged for baby.  Offered referral for home visiting, family declined. Patient declined any questions or concerns.

## 2019-03-18 NOTE — LACTATION NOTE
Lactation in to see patient. Baby at 9% weight loss. Encouraged increasing amount of doner milk given to baby. Baby latched at time of visit waiting to start sns. Once tube slide under shield. Baby pulls back to deep on breast. Writer placed tube over top shield and baby able to get on deeper. Nipple pulled to end of shield after feeding session done. Concern that baby not always on deep, so then not stimulating the breast, encouraged parents to be aware when baby nursing. Mom pumping and not getting any volume. Encouraged patient to increase her suction and hand express. Will leave message for LC, to visit patient tomorrow.

## 2019-03-18 NOTE — LACTATION NOTE
This note was copied from a baby's chart.  Second LC visit.  Infant is quite sleepy while attempting to nurse. After several minutes of stimulating her with no active response of sucking, LC recommended that they supplement infant and try again for the next feeding.  LC reviewed the importance of feeding her frequently due to infant weight loss. Milk is starting to transition.  Good volumes noted with HE and increasing pumped volumes.

## 2019-03-18 NOTE — PROGRESS NOTES
"OB Postpartum Note    S:  Patient without complaints. Nausea controlled, tolerating regular diet. Ambulating, voiding without difficulty. Passing flatus. Minimal lochia. Breastfeeding well. Pain controlled, incisional pain improving on left side.    O:  Vitals were reviewed  Blood pressure 129/71, temperature 98.2  F (36.8  C), temperature source Oral, resp. rate 18, height 1.626 m (5' 4\"), weight 110.7 kg (244 lb), last menstrual period 06/22/2018, SpO2 98 %, unknown if currently breastfeeding.          Gen - NAD, resting        CV - RRR        Abdomen - Fundus firm, below umbilicus, nontender        Incision - C/D/I, no swelling        Extremities - No calf tenderness, no unilateral edema    Hemoglobin   Date Value Ref Range Status   03/16/2019 10.1 (L) 11.7 - 15.7 g/dL Final   ]  ABO   Date Value Ref Range Status   03/14/2019 B  Final     RH(D)   Date Value Ref Range Status   03/14/2019 Pos  Final     No components found for: RUBELLA    A:   Postoperative Day #3 , s/p repeat CS - doing well    P:  1)  Discharge home--pain likely nerve pain, reviewed comfort measures. No concerns on exam.        2)  F/U 6 weeks w/ Primary OB      Kiana Em MD                      "

## 2019-03-18 NOTE — PLAN OF CARE
Patient vital signs stable and meeting expected outcomes.  Breastfeeding with SNS well with minimal assistance from staff.  Up independently and voiding adequately.  Pain well controlled with tylenol and ibuprofen.  Pt had a small BM tonight after suppository, but concerned that it was not more.  Incision WDL, tegaderm and steri strips still on.  Able to perform all cares for self and infant.  Bonding well with baby.  FOB present and supportive at bedside.  Plan to discharge home today.  Will continue to monitor.

## 2019-03-18 NOTE — DISCHARGE INSTRUCTIONS
Lactation [phone]   OB 6 weeks    Preeclampsia   Call your doctor right away if you have any of the following:  - Edema (swelling) in your face or hands  - Rapid weight gain-about 1 pound or more in a day  - Headache  - Abdominal pain on your right side  - Vision problems (flashes or spots)  - You have questions or concerns once you return home.  Postop  Birth Instructions    Activity       Do not lift more than 10 pounds for 6 weeks after surgery.  Ask family and friends for help when you need it.    No driving until you have stopped taking your pain medications (usually two weeks after surgery).    No heavy exercise or activity for 6 weeks.  Don't do anything that will put a strain on your surgery site.    Don't strain when using the toilet.  Your care team may prescribe a stool softener if you have problems with your bowel movements.     To care for your incision:       Keep the incision clean and dry.    Do not soak your incision in water. No swimming or hot tubs until it has fully healed. You may soak in the bathtub if the water level is below your incision.    Do not use peroxide, gel, cream, lotion, or ointment on your incision.    Adjust your clothes to avoid pressure on your surgery site (check the elastic in your underwear for example).     You may see a small amount of clear or pink drainage and this is normal.  Check with your health care provider:       If the drainage increases or has an odor.    If the incision reddens, you have swelling, or develop a rash.    If you have increased pain and the medicine we prescribed doesn't help.    If you have a fever above 100.4 F (38 C) with or without chills when placing thermometer under your tongue.   The area around your incision (surgery wound), will feel numb.  This is normal. The numbness should go away in less than a year.     Keep your hands clean:  Always wash your hands before touching your incision (surgery wound). This helps reduce your  risk of infection. If your hands aren't dirty, you may use an alcohol hand-rub to clean your hands. Keep your nails clean and short.    Call your healthcare provider if you have any of these symptoms:       You soak a sanitary pad with blood within 1 hour, or you see blood clots larger than a golf ball.    Bleeding that lasts more than 6 weeks.    Vaginal discharge that smells bad.    Severe pain, cramping or tenderness in your lower belly area.    A need to urinate more frequently (use the toilet more often), more urgently (use the toilet very quickly), or it burns when you urinate.    Nausea and vomiting.    Redness, swelling or pain around a vein in your leg.    Problems breastfeeding or a red or painful area on your breast.    Chest pain and cough or are gasping for air.    Problems with coping with sadness, anxiety or depression. If you have concerns about hurting yourself or the baby, call your provider immediately.      You have questions or concerns after you return home.

## 2019-03-20 ENCOUNTER — TELEPHONE (OUTPATIENT)
Dept: OBGYN | Facility: CLINIC | Age: 34
End: 2019-03-20

## 2019-03-25 NOTE — DISCHARGE SUMMARY
Admit Date:     03/15/2019   Discharge Date:     2019      DATE OF ADMISSION: 3/15/2019.      DATE OF DISCHARGE: 3/18/2019.      DISCHARGE DIAGNOSIS:  A 38-week gestation, chronic hypertension, elevated liver function tests, previous  section.      PROCEDURE:  Repeat low transverse  section.      HISTORY:  The patient is a 33-year-old  4, para 1 who presented at 38 weeks' gestation for repeat  section due to chronic hypertension and a history of elevated LFTs.      HOSPITAL COURSE:  For details of her surgery, please see her operative note dated 03/15.  Postoperatively, the patient remained afebrile with normal vital signs.  Her pain was adequately controlled, her postoperative hemoglobin was 10.1, which was felt to be appropriate.  By postoperative day #3, she was ambulating, tolerating a general diet and her pain was adequately controlled.  She was discharged home at this time with instructions to follow up in 6 weeks' time.         JOSE ANGEL SAENZ MD             D: 2019   T: 2019   MT: MS      Name:     TUSHAR RODRIGUEZ   MRN:      [id number]        Account:        [id number]   :      1985           Admit Date:     03/15/2019                                  Discharge Date: 2019      Document: [id number]

## 2020-03-01 ENCOUNTER — HEALTH MAINTENANCE LETTER (OUTPATIENT)
Age: 35
End: 2020-03-01

## 2020-04-05 ENCOUNTER — VIRTUAL VISIT (OUTPATIENT)
Dept: FAMILY MEDICINE | Facility: OTHER | Age: 35
End: 2020-04-05

## 2020-04-05 NOTE — PROGRESS NOTES
"Date: 2020 11:09:43  Clinician: CINTHYA Reed  Clinician NPI: [id number]  Patient: Leah Cueto  Patient : 1985  Patient Address: [address] [place], [place] [address]  Patient Phone: [phone]  Visit Protocol: URI  Patient Summary:  Leah is a 34 year old ( : 1985 ) female who initiated a Visit for COVID-19 (Coronavirus) evaluation and screening. When asked the question \"Please sign me up to receive news, health information and promotions. \", Leah responded \"No\".    Leah states her symptoms started 1-2 days ago.   Her symptoms consist of rhinitis, facial pain or pressure, myalgia, a sore throat, a cough, nasal congestion, malaise, chills, and a headache. Leah also feels feverish.   Symptom details     Nasal secretions: The color of her mucus is clear, white, and yellow.    Cough: Leah coughs a few times an hour and her cough is more bothersome at night. Phlegm does not come into her throat when she coughs. She believes her cough is caused by post-nasal drip.     Sore throat: Leah reports having mild throat pain (1-3 on a 10 point pain scale), does not have exudate on her tonsils, and can swallow liquids. She is not sure if the lymph nodes in her neck are enlarged. A rash has not appeared on the skin since the sore throat started.     Temperature: Her current temperature is 99.1 degrees Fahrenheit.     Facial pain or pressure: The facial pain or pressure feels worse when bending over or leaning forward.     Headache: She states the headache is moderate (4-6 on a 10 point pain scale).      Leah denies having ear pain, diarrhea, vomiting, nausea, teeth pain, and wheezing. She also denies taking antibiotic medication for the symptoms, having recent facial or sinus surgery in the past 60 days, and having a sinus infection within the past year. She is not experiencing dyspnea.   Precipitating events  Within the past week, Leah has not been exposed to " someone with strep throat. She has not recently been exposed to someone with influenza. Leah has been in close contact with the following high risk individuals: children under the age of 5.   Pertinent COVID-19 (Coronavirus) information  Leah has not traveled internationally or to the areas where COVID-19 (Coronavirus) is widespread, including cruise ship travel in the last 14 days before the start of her symptoms.   Leah has not had a close contact with a laboratory-confirmed COVID-19 patient within 14 days of symptom onset. She has had a close contact with a suspected COVID-19 patient within 14 days of symptom onset. Additional information about contact with COVID-19 (Coronavirus) patient as reported by the patient (free text): A co-worker of mine is presumed to have it. Last worked with her Sat March 28th.   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 She does not provide direct patient care. She lives with a healthcare worker.   Pertinent medical history  Leah typically gets a yeast infection when she takes antibiotics. She has used fluconazole (Diflucan) to treat previous yeast infections. 1 dose of fluconazole (Diflucan) has typically been sufficient for symptoms to resolve in the past.   Leah needs a return to work/school note.   Weight: 210 lbs   Leah does not smoke or use smokeless tobacco.   She denies pregnancy and denies breastfeeding. She has menstruated in the past month.   Weight: 210 lbs    MEDICATIONS: No current medications, ALLERGIES: erythromycin base  Clinician Response:  Dear Leah,      Based on the information you have provided, you do have symptoms that are consistent with Coronavirus (COVID-19).  The coronavirus causes mild to severe respiratory illness with the most common symptoms including fever, cough and difficulty breathing. Unfortunately, many viruses cause similar  symptoms and it can be difficult to distinguish between viruses, especially in mild cases, so we are presuming that anyone with cough or fever has coronavirus at this time.  Coronavirus/COVID-19 has reached the point of community spread in [place], meaning that we are finding the virus in people with no known exposure risk for isabela the virus. Given the increasing commonness of coronavirus in the community we are no longer testing patients who are not critically ill.  If you are a health care worker, you should refer to your employee health office for instructions about testing and returning to work.  For everyone else who has cough or fever, you should assume you are infected with coronavirus. Since you will not be tested but have symptoms that may be consistent with coronavirus, the CDC recommends you stay in self-isolation until these three things have happened:    You have had no fever for at least 72 hours (that is three full days of no fever without the use of medicine that reduces fevers)    AND   Other symptoms have improved (for example, when your cough or shortness of breath have improved)   AND   At least 7 days have passed since your symptoms first appeared.   How to Isolate:   Isolate yourself at home.  Do Not allow any visitors  Do Not go to work or school  Do Not go to [employer],  centers, shopping, or other public places.  Do Not shake hands.  Avoid close contact with others (hugging, kissing).   Protect Others:   Cover Your Mouth and Nose with a mask, disposable tissue or wash cloth to avoid spreading germs to others.  Wash your hands and face frequently with soap and water.   We know it can be scary to hear that you might have COVID-19. Our team can help track your symptoms and make sure you are doing ok over the next two weeks using a program called [employer] to keep in touch. When you receive an email from [employer], please consider enrolling in our monitoring program. There  is no cost to you for monitoring. Here is a URL where you can learn more: [email or web address]  Managing Symptoms:   At this time, we primarily recommend Tylenol (Acetaminophen) for fever or pain. If you have liver or kidney problems, contact your primary care provider for instructions on use of tylenol. Adults can take 650 mg (two 325 mg pills) by mouth every 4-6 hours as needed OR 1,000 mg (two 500 mg pills) every 8 hours as needed. MAXIMUM DAILY DOSE: 3,000mg. For children, refer to dosing on bottle based on age or weight.   If you develop significant shortness of breath that prevents you from doing normal activities, please call 911 or proceed to the nearest emergency room and alert them immediately that you have been in self-isolation for possible coronavirus.  If you have a higher risk medical condition such as cancer, heart failure, end stage renal disease on dialysis or have a transplant, please reach out to your specialist's clinic to advise them of your [facility] visit should you not improve within the next two days.   ---------  Medication information  Unless you are allergic to or are taking medications contraindicated to use, I recommend using the over-the-counter medication(s) below:   An antihistamine such as&nbsp;Benadryl, Claritin, or store brand.    A decongestant such as&nbsp;Sudafed PE or store brand.  Dextromethorphan (Robitussin DM or store brand). This medication is a cough suppressant that works by decreasing the feeling of needing to cough. Please follow the instructions on the package.  Guaifenesin + dextromethorphan (Robitussin DM, Mucinex DM, or store brand).  Saline nasal spray ([place] or store brand). Use 1-2 sprays in each nostril 3 times a day as needed for congestion.  A sinus irrigation kit such as&nbsp;Sinus Rinse, Neti Pot, SinuCleanse, or store brand. Be sure to use sterile or previously boiled water to prevent unwanted infections.  Oxymetazoline (Afrin or store brand)  nasal spray. Use 1 spray in each nostril 2 times a day for a maximum of 3 days. Using this medication more frequently or longer than recommended may cause nasal congestion to reoccur or worsen. Sinus pressure occurs when the tissues lining your sinuses become swollen and inflamed. Afrin nasal spray decreases the swelling to provide the quickest and most effective relief from sinus pressure.   Over-the-counter medications do not require a prescription. Ask the pharmacist if you have any questions.  Self care  The following tips will keep you as comfortable as possible while you recover:   Rest   Take a hot shower to loosen congestion  Use throat lozenges  Gargle with warm salt water (1/4 teaspoon of salt per 8 ounce glass of water)  Suck on frozen items such as popsicles or ice cubes  Drink hot tea with lemon and honey  Take a spoonful of honey to reduce your cough   Peppermint essential oils or candies; ginger ale/ginger tea, can help reduce GI ups       For more information about COVID19 and options for caring for yourself at home, please visit the CDC website at https://www.cdc.gov/coronavirus/2019-ncov/about/steps-when-sick.htmlFor more options for care at [facility], please visit our website at [email or web address]    COVID-19 (Coronavirus) General Information  With the increase in the number of COVID-19 (Coronavirus) cases, we understand you may have some questions. Below is some helpful information on COVID-19 (Coronavirus).  How can I protect myself and others from the COVID-19 (Coronavirus)?  Because there is currently no vaccine to prevent infection, the best way to protect yourself is to avoid being exposed to this virus. Put distance between yourself and other people if COVID-19 (Coronavirus) is spreading in your community. The virus is thought to spread mainly from person-to-person.     Between people who are in close contact with one another (within about 6 about) for  a prolonged period (10 minutes or longer).    Through respiratory droplets produced when an infected person coughs or sneezes.     The CDC recommends the following additional steps to protect yourself and others:     Wash your hands often with soap and water for at least 20 seconds, especially after blowing your nose, coughing, or sneezing; going to the bathroom; and before eating or preparing food.  Use an alcohol-based hand  that contains at least 60 percent alcohol if soap and water are not available.        Avoid touching your eyes, nose and mouth with unwashed hands.    Avoid close contact with people who are sick.    Stay home when you are sick.    Cover your cough or sneeze with a tissue, then throw the tissue in the trash.    Clean and disinfect frequently touched objects and surfaces.     You can help stop COVID-19 (Coronavirus) by knowing the signs and symptoms:     Fever    Cough    Shortness of breath     Contact your healthcare provider if   Develop symptoms   AND   Have been in close contact with a person known to have COVID-19 (Coronavirus) or live in or have recently traveled from an area with ongoing spread of COVID-19 (Coronavirus). Call ahead before you go to a doctor's office or emergency room. Tell them about your recent travel and your symptoms.   For the most up to date information, visit the CDC's website.  Self-monitoring  Self-monitoring means people should monitor themselves for fever by taking their temperatures twice a day and remain alert for a cough or difficulty breathing.  It is important to check your health two times each day for 14 days after a potential exposure to a person with COVID-19 (Coronavirus) or after travel from a location where COVID-19 (Coronavirus) is widespread. If you have been exposed to a person with COVID-19 (Coronavirus), it may take up to 14 days to know if you will get sick. Follow the steps below to check and record your health.     Take your  temperature with a thermometer twice a day, once in the morning and once in the evening, and watch for a cough or difficulty breathing for 14 days.    Write down your temperature and any COVID-19 symptoms you may have: feeling feverish, coughing, or difficulty breathing.    Stay home from work or school.    Do not take public transportation, taxis, or ride-shares.    Avoid crowded places (such as shopping centers and movie theaters) and limit your activities in public.    Keep your distance from others (about 6 feet or 2 meters).    If you get sick with fever, cough, or trouble breathing, contact your healthcare provider and tell them about your recent travel and/or your symptoms.    If you need to seek medical care for other reasons, such as dialysis, call ahead to your doctor and tell them about your recent travel.     Steps to help prevent the spread of COVID-19 (Coronavirus) if you are sick  If you are sick with COVID-19 (Coronavirus) or suspect you are infected with the virus that causes COVID-19 (Coronavirus), follow the steps below to help prevent the disease from spreading&nbsp;to people in your home and community.     Stay home except to get medical care. Home isolation may be started in consultation with your healthcare clinician.    Separate yourself from other people and animals in your home.    Call ahead before visiting your doctor if you have a medical appointment.    Wear a facemask when you are around other people.    Cover your cough and sneezes.    Clean your hands often.    Avoid sharing personal household items.    Clean and disinfect frequently touched objects and surfaces everyday.    You will need to have someone drop off medications or household supplies (if needed) at your house without coming inside or in contact with you or others living in your house.    Monitor your symptoms and seek prompt medical care if your illness is worsening (e.g. Difficulty breathing).    Discontinue home  "isolation only in consultation with your healthcare provider.     For more detailed and up to date information on what to do if you are sick, visit this link: What to Do If You Are Sick With COVID-19.  Do I need to be tested for COVID-19 (Coronavirus)?     Not everyone needs to be tested for COVID-19 (Coronavirus). Decisions on which patients receive testing will be based on the local spread of COVID-19 (Coronavirus) as well as the symptoms. Your healthcare provider will make the final decision on whether you should be tested.    In the meantime, if you have concerns that you may have been exposed, it is reasonable to practice \"social distancing.\"&nbsp; If you are ill with a cold or flu-like illness, please monitor your symptoms and call your healthcare provider if your symptoms worsen.    For more up to date information, visit this link: COVID-19 (Coronavirus) Frequently Asked Questions and Answers.      Diagnosis: Coronavirus infection, unspecified  Diagnosis ICD: B34.2  Additional Clinician Notes: Please follow up with your employee occupational health clinic regarding clearance to return to work and for testing  "

## 2020-04-06 ENCOUNTER — COMMUNICATION - HEALTHEAST (OUTPATIENT)
Dept: SCHEDULING | Facility: CLINIC | Age: 35
End: 2020-04-06

## 2020-12-14 ENCOUNTER — HEALTH MAINTENANCE LETTER (OUTPATIENT)
Age: 35
End: 2020-12-14

## 2021-04-17 ENCOUNTER — HEALTH MAINTENANCE LETTER (OUTPATIENT)
Age: 36
End: 2021-04-17

## 2021-05-31 ENCOUNTER — RECORDS - HEALTHEAST (OUTPATIENT)
Dept: ADMINISTRATIVE | Facility: CLINIC | Age: 36
End: 2021-05-31

## 2021-06-07 NOTE — TELEPHONE ENCOUNTER
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 She states that she was told by her boss to contact the hospital.     Advised that she needs to contact her HR/employee health department since she was exposed at work to get testing ordered for her. She verbalizes understanding and she will contact her employee health services provider.     Olga Aparicio RN [facility] Care Connection Triage/Med Refill 4/6/2020 9:39 AM    COVID 19 Nurse Triage Plan/Patient Instructions    Please be aware that novel coronavirus (COVID-19) may be circulating in the community. If you develop symptoms such as fever, cough, or SOB or if you have concerns about the presence of another infection including coronavirus (COVID-19), please contact your health care provider or visit [email or web address].     Disposition/Instructions    Patient to have an OnCare Visit with a provider (Preferred option). Follow System Ambulatory Workflow for COVID 19. It is recommended that you setup an OnCare Visit with one of our virtual providers.  To do this follow these instructions:    1. Go to the website [email or web address]  2. Create an account (you will need your insurance information)  3. Start a new visit  4. Choose your diagnosis (e.g. COVID19)  5. Fill out the information about your symptoms  6. A provider will reach out to you by text, phone call or video visit based on your request    Call Back If: Your symptoms worsen before you are able to complete your On[facility] Visit with a provider.    Thank you for limiting contact with others, wearing a simple mask to cover your cough, practice good hand hygiene habits and accessing our virtual services where possible to limit the spread of this virus.    For more information about COVID19 and options for caring for  yourself at home, please visit the CDC website at https://www.cdc.gov/coronavirus/2019-ncov/about/steps-when-sick.html  For more options for care at [facility], please visit our website at [email or web address]    For more information, please use the [place] Department of Health ([facility]) COVID-19 Hotlines (Interpreters available):     Health questions: Phone Number: [phone] or [phone] and Hours: 7 a.m. to 7 p.m.    Schools and  questions: Phone Number: [phone] or [phone] and Hours 7 a.m. to 7 p.m.      Reason for Disposition    1] COVID-19 infection diagnosed or suspected AND [2] mild symptoms (fever, cough) AND [2] no trouble breathing or other complications     Health care worker- pharmacy tech    Protocols used: CORONAVIRUS (COVID-19) DIAGNOSED OR [id number] 3.30.20

## 2021-08-16 ENCOUNTER — ANCILLARY PROCEDURE (OUTPATIENT)
Dept: GENERAL RADIOLOGY | Facility: CLINIC | Age: 36
End: 2021-08-16
Attending: PHYSICIAN ASSISTANT
Payer: OTHER GOVERNMENT

## 2021-08-16 ENCOUNTER — OFFICE VISIT (OUTPATIENT)
Dept: URGENT CARE | Facility: URGENT CARE | Age: 36
End: 2021-08-16
Payer: OTHER GOVERNMENT

## 2021-08-16 VITALS
WEIGHT: 225 LBS | BODY MASS INDEX: 38.62 KG/M2 | DIASTOLIC BLOOD PRESSURE: 79 MMHG | HEART RATE: 70 BPM | OXYGEN SATURATION: 99 % | SYSTOLIC BLOOD PRESSURE: 112 MMHG | TEMPERATURE: 98.8 F

## 2021-08-16 DIAGNOSIS — S60.221A CONTUSION OF RIGHT HAND, INITIAL ENCOUNTER: ICD-10-CM

## 2021-08-16 DIAGNOSIS — G56.01 CARPAL TUNNEL SYNDROME OF RIGHT WRIST: ICD-10-CM

## 2021-08-16 DIAGNOSIS — S60.221A CONTUSION OF RIGHT HAND, INITIAL ENCOUNTER: Primary | ICD-10-CM

## 2021-08-16 PROCEDURE — 73130 X-RAY EXAM OF HAND: CPT | Mod: RT | Performed by: RADIOLOGY

## 2021-08-16 PROCEDURE — 99214 OFFICE O/P EST MOD 30 MIN: CPT | Performed by: PHYSICIAN ASSISTANT

## 2021-08-16 NOTE — PATIENT INSTRUCTIONS
Patient was educated on the natural course of injury.  No acute fracture or dislocation seen on x-ray. Conservative measures discussed including rest, ice, compression, wrist exercises, and over-the-counter analgesics (Tylenol or Ibuprofen) as needed. See your primary care provider if symptoms worsen or do not improve in 7 days. Seek emergency care if you develop severe pain/swelling, inability to move extremity, skin paleness, or weakness.

## 2021-08-16 NOTE — PROGRESS NOTES
URGENT CARE VISIT:    SUBJECTIVE:   Chief Complaint   Patient presents with     Hand Injury     X4 Days right wrist injury, patient walked by and bumped into a chair hitting her right wrist, small bump formed and wrist was swollen and discoloured, pain raidaitng to arm and is numb, painful with pressure on wrist, used ice and ibuprofen -slight relief       Leah Cueto is a 35 year old female who presents with a chief complaint of right wrist pain.  Symptoms began 4 day(s) ago, are moderate and sudden onset  Mechanism: bumped hand on corner of chair.   Associated symptoms include n/t in hands which she has had for a few weeks but worsened since incident.   Pain exacerbated by movement. Relieved by rest.  She treated it initially with ice and Ibuprofen with some relieef. This is the first time this type of injury has occurred to this patient. She works as a pharmacy tech. Symptoms worsen with activity and improve with rest.     PMH:   Past Medical History:   Diagnosis Date     Numbness and tingling     [place] numbness and tingling to legs/ft     Other chronic pain     back     Allergies: Erythromycin and Vicodin [hydrocodone-acetaminophen]   Medications:   Current Outpatient Medications   Medication Sig Dispense Refill     Melatonin 10 MG TABS tablet Take 10 mg by mouth nightly as needed for sleep       valACYclovir (VALTREX) 500 MG tablet Take 500 mg by mouth 2 times daily       calcium carbonate 600 mg-vitamin D 400 units (CALTRATE) 600-400 MG-UNIT per tablet Take 1 tablet by mouth 2 times daily  (Patient not taking: Reported on 8/16/2021)       Cholecalciferol (VITAMIN D3 PO) Take 2,000 Units by mouth 2 times daily (Patient not taking: Reported on 8/16/2021)       Docosahexaenoic Acid (DHA COMPLETE PO) Take 1 tablet by mouth daily  (Patient not taking: Reported on 8/16/2021)       folic acid (FOLVITE) 400 MCG tablet Take 2 mg by mouth 2 times daily  (Patient not taking: Reported on 8/16/2021)       oxyCODONE  (ROXICODONE) 5 MG tablet Take 1 tablet (5 mg) by mouth every 6 hours as needed for breakthrough pain (Patient not taking: Reported on 8/16/2021) 6 tablet 0     PRENATAL VITAMINS PO Take 1 tablet by mouth At Bedtime  (Patient not taking: Reported on 8/16/2021)       Social History:   Social History     Tobacco Use     Smoking status: Former Smoker     Smokeless tobacco: Never Used     Tobacco comment: quit 12 yrs ago   Substance Use Topics     Alcohol use: Yes     Comment: 2x/ week       ROS:  Review of systems negative except as stated above.    OBJECTIVE:  /79   Pulse 70   Temp 98.8  F (37.1  C) (Oral)   Wt 102.1 kg (225 lb)   SpO2 99%   BMI 38.62 kg/m    GENERAL APPEARANCE: healthy, alert and no distress  MUSCULOSKELETAL: moderate TTP over dorsal mid aspect of right hand. FROM wrist. Mild TTP over proximal forearm.   EXTREMITIES: peripheral pulses normal  SKIN: Moderate ecchymosis and 2 cm nodule on dorsal aspect of right hand.   NEURO: sensation intact.    IMAGING:  X-RAY was done.  X-ray ordered and interpreted in the office independently by me. X-ray shows: no acute fracture or dislocation seen on x-ray.      ASSESSMENT:    ICD-10-CM    1. Contusion of right hand, initial encounter  S60.221A XR Hand Right G/E 3 Views   2. Carpal tunnel syndrome of right wrist  G56.01 Wrist/Arm Supplies Order       PLAN:  30 minutes spent during clinic visit.   Patient Instructions   Patient was educated on the natural course of injury.  No acute fracture or dislocation seen on x-ray. I also believe patient has carpal tunnel syndrome which was aggravated by the injury. Conservative measures discussed including rest, ice, compression, wrist exercises, and over-the-counter analgesics (Tylenol or Ibuprofen) as needed. Wrist exercise handout given. See your primary care provider if symptoms worsen or do not improve in 7 days. Seek emergency care if you develop severe pain/swelling, inability to move extremity, skin  paleness, or weakness.     Patient verbalized understanding and is agreeable to plan. The patient was discharged ambulatory and in stable condition.    Camilla Duarte PA-C on 8/16/2021 at 5:21 PM

## 2021-10-02 ENCOUNTER — HEALTH MAINTENANCE LETTER (OUTPATIENT)
Age: 36
End: 2021-10-02

## 2022-02-21 ENCOUNTER — OFFICE VISIT (OUTPATIENT)
Dept: URGENT CARE | Facility: URGENT CARE | Age: 37
End: 2022-02-21
Payer: OTHER GOVERNMENT

## 2022-02-21 VITALS
SYSTOLIC BLOOD PRESSURE: 113 MMHG | TEMPERATURE: 98.5 F | BODY MASS INDEX: 36.9 KG/M2 | WEIGHT: 215 LBS | DIASTOLIC BLOOD PRESSURE: 68 MMHG | OXYGEN SATURATION: 98 % | HEART RATE: 58 BPM

## 2022-02-21 DIAGNOSIS — J06.9 VIRAL URI: Primary | ICD-10-CM

## 2022-02-21 PROCEDURE — 99213 OFFICE O/P EST LOW 20 MIN: CPT | Performed by: FAMILY MEDICINE

## 2022-02-21 RX ORDER — SERTRALINE HYDROCHLORIDE 100 MG/1
100 TABLET, FILM COATED ORAL DAILY
COMMUNITY
End: 2023-06-08

## 2022-02-21 NOTE — PROGRESS NOTES
Assessment & Plan     Viral URI  MIld symptoms of a URI likely received from her daughter who also has respiratory symptoms. Normal exam and vitals.    F/u as needed  Declines COVID test, at home COVID test has been negative thus far including for daughter.     Yordy Razo MD   [place] [facility]    Subjective     Leah is a 36 year old female who presents to clinic today for the following health issues:  Chief Complaint   Patient presents with     Urgent Care     Fever     feels feverish, nasal congestion.     HPI     Daughter with a sinus infection/Viral URI for weeks  Symptoms beginning: feels feverish but no recorded fevers. Slight headache and congestion.   No shortness of breath/chest pain    Slight minimal cough possibly drainage related. Does not feel very congested      No known exposures to COVID in the last 2 weeks    Remedies attempted: none      no previous infections to COVID  Fully vaccinated for COVID with booster (moderna on 21)      Patient Active Problem List    Diagnosis Date Noted     S/P  section 03/15/2019     Priority: Medium     Morbid obesity (H) 09/10/2018     Priority: Medium     Spondylolisthesis of lumbar region 2014     Priority: Medium       Current Outpatient Medications   Medication     sertraline (ZOLOFT) 100 MG tablet     calcium carbonate 600 mg-vitamin D 400 units (CALTRATE) 600-400 MG-UNIT per tablet     Cholecalciferol (VITAMIN D3 PO)     Docosahexaenoic Acid (DHA COMPLETE PO)     folic acid (FOLVITE) 400 MCG tablet     Melatonin 10 MG TABS tablet     oxyCODONE (ROXICODONE) 5 MG tablet     PRENATAL VITAMINS PO     valACYclovir (VALTREX) 500 MG tablet     No current facility-administered medications for this visit.           Objective    /68   Pulse 58   Temp 98.5  F (36.9  C) (Oral)   Wt 97.5 kg (215 lb)   SpO2 98%   BMI 36.90 kg/m    Physical Exam     GEN: NAD  CV: HDS  Pulm: clear bilaterally    No results found for any visits on  02/21/22.                The use of Dragon/Hitlantisation services may have been used to construct the content in this note; any grammatical or spelling errors are non-intentional. Please contact the author of this note directly if you are in need of any clarification.

## 2022-03-07 ENCOUNTER — OFFICE VISIT (OUTPATIENT)
Dept: URGENT CARE | Facility: URGENT CARE | Age: 37
End: 2022-03-07
Payer: OTHER GOVERNMENT

## 2022-03-07 ENCOUNTER — NURSE TRIAGE (OUTPATIENT)
Dept: NURSING | Facility: CLINIC | Age: 37
End: 2022-03-07
Payer: OTHER GOVERNMENT

## 2022-03-07 VITALS
DIASTOLIC BLOOD PRESSURE: 71 MMHG | HEART RATE: 62 BPM | SYSTOLIC BLOOD PRESSURE: 121 MMHG | OXYGEN SATURATION: 100 % | RESPIRATION RATE: 12 BRPM | TEMPERATURE: 97.5 F

## 2022-03-07 DIAGNOSIS — R19.7 DIARRHEA, UNSPECIFIED TYPE: ICD-10-CM

## 2022-03-07 DIAGNOSIS — R11.0 NAUSEA: Primary | ICD-10-CM

## 2022-03-07 DIAGNOSIS — R10.84 ABDOMINAL PAIN, GENERALIZED: ICD-10-CM

## 2022-03-07 DIAGNOSIS — K52.9 GASTROENTERITIS: ICD-10-CM

## 2022-03-07 LAB
BASOPHILS # BLD AUTO: 0 10E3/UL (ref 0–0.2)
BASOPHILS NFR BLD AUTO: 0 %
EOSINOPHIL # BLD AUTO: 0.1 10E3/UL (ref 0–0.7)
EOSINOPHIL NFR BLD AUTO: 1 %
ERYTHROCYTE [DISTWIDTH] IN BLOOD BY AUTOMATED COUNT: 13.5 % (ref 10–15)
HCT VFR BLD AUTO: 42.4 % (ref 35–47)
HGB BLD-MCNC: 13.4 G/DL (ref 11.7–15.7)
LYMPHOCYTES # BLD AUTO: 1.3 10E3/UL (ref 0.8–5.3)
LYMPHOCYTES NFR BLD AUTO: 13 %
MCH RBC QN AUTO: 29.7 PG (ref 26.5–33)
MCHC RBC AUTO-ENTMCNC: 31.6 G/DL (ref 31.5–36.5)
MCV RBC AUTO: 94 FL (ref 78–100)
MONOCYTES # BLD AUTO: 0.7 10E3/UL (ref 0–1.3)
MONOCYTES NFR BLD AUTO: 8 %
NEUTROPHILS # BLD AUTO: 7.3 10E3/UL (ref 1.6–8.3)
NEUTROPHILS NFR BLD AUTO: 77 %
PLATELET # BLD AUTO: 289 10E3/UL (ref 150–450)
RBC # BLD AUTO: 4.51 10E6/UL (ref 3.8–5.2)
WBC # BLD AUTO: 9.5 10E3/UL (ref 4–11)

## 2022-03-07 PROCEDURE — 36415 COLL VENOUS BLD VENIPUNCTURE: CPT | Performed by: NURSE PRACTITIONER

## 2022-03-07 PROCEDURE — 99214 OFFICE O/P EST MOD 30 MIN: CPT | Performed by: NURSE PRACTITIONER

## 2022-03-07 PROCEDURE — 85025 COMPLETE CBC W/AUTO DIFF WBC: CPT | Performed by: NURSE PRACTITIONER

## 2022-03-07 RX ORDER — ONDANSETRON 4 MG/1
4 TABLET, ORALLY DISINTEGRATING ORAL EVERY 6 HOURS PRN
Qty: 10 TABLET | Refills: 0 | Status: SHIPPED | OUTPATIENT
Start: 2022-03-07 | End: 2022-09-05

## 2022-03-07 RX ORDER — ONDANSETRON 4 MG/1
4 TABLET, ORALLY DISINTEGRATING ORAL ONCE
Status: COMPLETED | OUTPATIENT
Start: 2022-03-07 | End: 2022-03-07

## 2022-03-07 RX ADMIN — ONDANSETRON 4 MG: 4 TABLET, ORALLY DISINTEGRATING ORAL at 15:19

## 2022-03-07 NOTE — PATIENT INSTRUCTIONS
Patient Education     Food Poisoning or Viral Gastroenteritis (Adult)  You have a stomach illness that is likely either food poisoning or viral gastroenteritis.  Food poisoning is illness that is passed along in food and affects the stomach and intestinal tract. It usually occurs from 1 to 24 hours after eating food that has spoiled. When it happens within a few hours of eating, it is often caused by toxins from bacteria in food that has not been cooked or refrigerated properly.  Viral gastroenteritis is an illness from a virus that also affects the stomach and intestinal tract. Many people call it the  stomach flu,  but it has nothing to do with influenza. In fact, it can happen from food poisoning, but it can also happen when germs are passed from person-to-person or contaminated surface (toothbrush, cutting board, toilet) to a person.  Either illness can cause these symptoms:    Abdominal pain and cramping    Nausea    Vomiting    Diarrhea    Fever and chills    Loss of bowel control  The symptoms of food poisoning usually last 1 to 2 days. The symptoms of viral gastroenteritis can sometimes last up to 7 days but usually end sooner.  Antibiotics are not effective for either illness.  Other causes of gastroenteritis include bacteria and parasites which are not discussed here.  Home care  Follow all instructions given by your healthcare provider. Rest at home for the next 24 hours, or until you feel better. Avoid caffeine, tobacco, and alcohol. These can make diarrhea, cramping, and pain worse.  If taking medicines:    Over-the-counter diarrhea or nausea medicines are generally OK unless you have bleeding, fever, or severe abdominal pain.    You may use acetaminophen or NSAID medicines like ibuprofen or naproxen to reduce pain and fever. Don t use these if you have chronic liver or kidney disease, or ever had a stomach ulcer or gastrointestinal bleeding. Talk with your healthcare provider first. Don't use NSAID  medicines if you are already taking one for another condition (like arthritis) or are on daily aspirin therapy (such as for heart disease or after a stroke).  To prevent the spread of illness:    Remember that washing with soap and water is the best way to prevent the spread of infection. Wash your hands before and after caring for a sick person. Dry your hands with a single use towel.    Clean the toilet after each use.    Wash your hands or use alcohol-based hand  before eating.    Wash your hands or use alcohol-based hand  before and after preparing food. Keep in mind that people with diarrhea or vomiting should not prepare food for others.    Wash your hands or use alcohol-based hand  after using cutting boards, counter-tops, and knives (and other utensils) that have been in contact with raw foods.    Wash and then peel fruits and vegetables.    Keep uncooked meats away from cooked and ready-to-eat foods.    Use a food thermometer when cooking. Cook poultry to at least 165 F (74 C). Cook ground meat (beef, veal, pork, lamb) to at least 160 F (71 C). Cook fresh beef, veal, lamb, and pork to at least 145 F (63 C).    Don t eat raw or undercooked eggs (poached or savannah side up), poultry, meat or unpasteurized milk and juices.  Food and drinks  The main goal while treating vomiting or diarrhea is to prevent dehydration. This is done by taking small amounts of liquids often.    Keep in mind that liquids are more important than food right now.    Drink only small amounts of liquids at a time.    Don t force yourself to eat, especially if you are having cramping, vomiting, or diarrhea. Don t eat large amounts at a time, even if you are hungry.    If you eat, avoid fatty, greasy, spicy, or fried foods.    Don t eat dairy foods or drink milk if you have diarrhea. These can make diarrhea worse.  The first 24 hours you can try:    Oral rehydration solutions, available at grocery stores and  pharmacies. Sports drinks are not a good choice if you are very dehydrated. They have too much sugar and not enough electrolytes.    Soft drinks without caffeine    Ginger ale    Water (plain or flavored)    Decaf tea or coffee    Clear broth, consommé, or bouillon    Gelatin, ice pops, or frozen fruit juice bars   The second 24 hours, if you are feeling better, you can add:    Hot cereal, plain toast, bread, rolls, or crackers    Plain noodles, rice, mashed potatoes, chicken noodle soup, or rice soup    Unsweetened canned fruit (no pineapple)    Bananas  As you recover:    Limit fat intake to less than 15 grams per day. Don t eat margarine, butter, oils, mayonnaise, sauces, gravies, fried foods, peanut butter, meat, poultry, or fish.    Limit fiber. Don t eat raw or cooked vegetables, fresh fruits except bananas, and bran cereals.    Limit caffeine and chocolate.    Don t use spices or seasonings except salt.    Resume a normal diet over time, as you feel better and your symptoms improve.    If the symptoms come back, go back to a simple diet or clear liquids.  Follow-up care  Follow up with your healthcare provider, or as advised. If a stool sample was taken or cultures were done, call the healthcare provider for the results as instructed.  Call 911  Call 911 if you have any of these symptoms:    Trouble breathing    Confusion    Extreme drowsiness or trouble walking    Loss of consciousness    Rapid heart rate    Chest pain    Stiff neck    Seizure  When to seek medical advice  Call your healthcare provider right away if any of these occur:    Abdominal pain that gets worse    Constant lower right abdominal pain    Continued vomiting and inability to keep liquids down    Diarrhea more than 5 times a day    Blood in vomit or stool    Dark urine or no urine for 8 hours, dry mouth and tongue, tiredness, weakness, or dizziness    New rash    You don t get better in 2 to 3 days    Fever of 100.4 F (38 C) or higher, or  as directed by your healthcare provider    You have new symptoms of arthritis  [employer] last reviewed this educational content on 6/1/2018 2000-2021 [employer]. All rights reserved. This information is not intended as a substitute for professional medical care. Always follow your healthcare professional's instructions.

## 2022-03-07 NOTE — PROGRESS NOTES
Clinic Administered Medication Documentation    Administrations This Visit     ondansetron (ZOFRAN-ODT) ODT tab 4 mg     Admin Date  03/07/2022 Action  Given Dose  4 mg Route  Oral Site   Administered By  Marilyn Meeks    Ordering Provider: Noni Roberson, NP    Patient Supplied?: No                Oral Medication Documentation    Patient was given Ondansetron (Zofran) . Prior to medication administration, verified patients identity using patient s name and date of birth. Please see MAR and medication order for additional information.     Was entire amount of medication used? Yes  Expiration Date: 03/01/2024    MILY Camacho

## 2022-03-07 NOTE — PROGRESS NOTES
Chief Complaint   Patient presents with     Urgent Care     Diarrhea     Stomach pain and diarrhea-Sx started 5am this morning     SUBJECTIVE:  Leah Cueto is a 36 year old female who presents today with generalized abdominal cramping, 6/10 pain, stabbing, tender, profuse watery diarrhea 14x since this morning, nausea, fatigue, chills. She denies fever, blood, mucus, syncope, recent antibiotics. She did eat at [employer] yesterday. Has not tried any medicine for this. Drinking water, voiding okay. No relevant PMH.    Past Medical History:   Diagnosis Date     Numbness and tingling     [place] numbness and tingling to legs/ft     Other chronic pain     back     Current Outpatient Medications   Medication Sig Dispense Refill     ondansetron (ZOFRAN-ODT) 4 MG ODT tab Take 1 tablet (4 mg) by mouth every 6 hours as needed for nausea 10 tablet 0     sertraline (ZOLOFT) 100 MG tablet Take 100 mg by mouth daily       valACYclovir (VALTREX) 500 MG tablet Take 500 mg by mouth 2 times daily       calcium carbonate 600 mg-vitamin D 400 units (CALTRATE) 600-400 MG-UNIT per tablet Take 1 tablet by mouth 2 times daily  (Patient not taking: Reported on 8/16/2021)       Cholecalciferol (VITAMIN D3 PO) Take 2,000 Units by mouth 2 times daily (Patient not taking: Reported on 8/16/2021)       Docosahexaenoic Acid (DHA COMPLETE PO) Take 1 tablet by mouth daily  (Patient not taking: Reported on 8/16/2021)       folic acid (FOLVITE) 400 MCG tablet Take 2 mg by mouth 2 times daily  (Patient not taking: Reported on 8/16/2021)       Melatonin 10 MG TABS tablet Take 10 mg by mouth nightly as needed for sleep (Patient not taking: Reported on 3/7/2022)       oxyCODONE (ROXICODONE) 5 MG tablet Take 1 tablet (5 mg) by mouth every 6 hours as needed for breakthrough pain (Patient not taking: Reported on 8/16/2021) 6 tablet 0     PRENATAL VITAMINS PO Take 1 tablet by mouth At Bedtime  (Patient not taking: Reported on 8/16/2021)       Social History      Tobacco Use     Smoking status: Former Smoker     Smokeless tobacco: Never Used     Tobacco comment: quit 12 yrs ago   Substance Use Topics     Alcohol use: Yes     Comment: 2x/ week     Allergies   Allergen Reactions     Erythromycin Nausea and Vomiting     Vicodin [Hydrocodone-Acetaminophen] Nausea and Vomiting     Review of Systems   All systems negative except for those listed above in HPI.    OBJECTIVE:  /71   Pulse 62   Temp 97.5  F (36.4  C) (Tympanic)   Resp 12   LMP 02/25/2022   SpO2 100%   Breastfeeding No      Physical Exam  Vitals reviewed.   Constitutional:       Appearance: Normal appearance.   HENT:      Head: Normocephalic and atraumatic.   Cardiovascular:      Rate and Rhythm: Normal rate.   Pulmonary:      Effort: Pulmonary effort is normal.   Abdominal:      General: Bowel sounds are normal. There is distension.      Palpations: Abdomen is soft. There is no mass.      Tenderness: There is abdominal tenderness. There is no guarding or rebound.      Hernia: No hernia is present.   Musculoskeletal:         General: Normal range of motion.   Skin:     General: Skin is warm and dry.      Coloration: Skin is not jaundiced or pale.      Findings: No rash.   Neurological:      General: No focal deficit present.      Mental Status: She is alert and oriented to person, place, and time.      Motor: No weakness.      Gait: Gait normal.   Psychiatric:         Mood and Affect: Mood normal.         Behavior: Behavior normal.       Results for orders placed or performed in visit on 03/07/22   CBC with platelets and differential     Status: None   Result Value Ref Range    WBC Count 9.5 4.0 - 11.0 10e3/uL    RBC Count 4.51 3.80 - 5.20 10e6/uL    Hemoglobin 13.4 11.7 - 15.7 g/dL    Hematocrit 42.4 35.0 - 47.0 %    MCV 94 78 - 100 fL    MCH 29.7 26.5 - 33.0 pg    MCHC 31.6 31.5 - 36.5 g/dL    RDW 13.5 10.0 - 15.0 %    Platelet Count 289 150 - 450 10e3/uL    % Neutrophils 77 %    % Lymphocytes 13 %     % Monocytes 8 %    % Eosinophils 1 %    % Basophils 0 %    Absolute Neutrophils 7.3 1.6 - 8.3 10e3/uL    Absolute Lymphocytes 1.3 0.8 - 5.3 10e3/uL    Absolute Monocytes 0.7 0.0 - 1.3 10e3/uL    Absolute Eosinophils 0.1 0.0 - 0.7 10e3/uL    Absolute Basophils 0.0 0.0 - 0.2 10e3/uL   CBC with platelets and differential     Status: None    Narrative    The following orders were created for panel order CBC with platelets and differential.  Procedure                               Abnormality         Status                     ---------                               -----------         ------                     CBC with platelets and d...[[id number]]                      Final result                 Please view results for these tests on the individual orders.     ASSESSMENT:    ICD-10-CM    1. Nausea  R11.0 CBC with platelets and differential     ondansetron (ZOFRAN-ODT) ODT tab 4 mg     CBC with platelets and differential     ondansetron (ZOFRAN-ODT) 4 MG ODT tab   2. Abdominal pain, generalized  R10.84 CBC with platelets and differential     CBC with platelets and differential   3. Diarrhea, unspecified type  R19.7 CBC with platelets and differential     CBC with platelets and differential   4. Gastroenteritis  K52.9      PLAN:    CBC normal, no overwhelming bacterial infection  Most likely viral gastroenteritis, stomach flu that should run its course  Zofran, jakob, probiotics, bland diet, water, simethicone for gas pains  Reevaluation for stool panel if lingering diarrhea past a week  ER if severe worsening pain, dizziness, blood    Patient Instructions     Patient Education     Food Poisoning or Viral Gastroenteritis (Adult)  You have a stomach illness that is likely either food poisoning or viral gastroenteritis.  Food poisoning is illness that is passed along in food and affects the stomach and intestinal tract. It usually occurs from 1 to 24 hours after eating food that has spoiled. When it happens within a few  hours of eating, it is often caused by toxins from bacteria in food that has not been cooked or refrigerated properly.  Viral gastroenteritis is an illness from a virus that also affects the stomach and intestinal tract. Many people call it the  stomach flu,  but it has nothing to do with influenza. In fact, it can happen from food poisoning, but it can also happen when germs are passed from person-to-person or contaminated surface (toothbrush, cutting board, toilet) to a person.  Either illness can cause these symptoms:    Abdominal pain and cramping    Nausea    Vomiting    Diarrhea    Fever and chills    Loss of bowel control  The symptoms of food poisoning usually last 1 to 2 days. The symptoms of viral gastroenteritis can sometimes last up to 7 days but usually end sooner.  Antibiotics are not effective for either illness.  Other causes of gastroenteritis include bacteria and parasites which are not discussed here.  Home care  Follow all instructions given by your healthcare provider. Rest at home for the next 24 hours, or until you feel better. Avoid caffeine, tobacco, and alcohol. These can make diarrhea, cramping, and pain worse.  If taking medicines:    Over-the-counter diarrhea or nausea medicines are generally OK unless you have bleeding, fever, or severe abdominal pain.    You may use acetaminophen or NSAID medicines like ibuprofen or naproxen to reduce pain and fever. Don t use these if you have chronic liver or kidney disease, or ever had a stomach ulcer or gastrointestinal bleeding. Talk with your healthcare provider first. Don't use NSAID medicines if you are already taking one for another condition (like arthritis) or are on daily aspirin therapy (such as for heart disease or after a stroke).  To prevent the spread of illness:    Remember that washing with soap and water is the best way to prevent the spread of infection. Wash your hands before and after caring for a sick person. Dry your hands with  a single use towel.    Clean the toilet after each use.    Wash your hands or use alcohol-based hand  before eating.    Wash your hands or use alcohol-based hand  before and after preparing food. Keep in mind that people with diarrhea or vomiting should not prepare food for others.    Wash your hands or use alcohol-based hand  after using cutting boards, counter-tops, and knives (and other utensils) that have been in contact with raw foods.    Wash and then peel fruits and vegetables.    Keep uncooked meats away from cooked and ready-to-eat foods.    Use a food thermometer when cooking. Cook poultry to at least 165 F (74 C). Cook ground meat (beef, veal, pork, lamb) to at least 160 F (71 C). Cook fresh beef, veal, lamb, and pork to at least 145 F (63 C).    Don t eat raw or undercooked eggs (poached or savannah side up), poultry, meat or unpasteurized milk and juices.  Food and drinks  The main goal while treating vomiting or diarrhea is to prevent dehydration. This is done by taking small amounts of liquids often.    Keep in mind that liquids are more important than food right now.    Drink only small amounts of liquids at a time.    Don t force yourself to eat, especially if you are having cramping, vomiting, or diarrhea. Don t eat large amounts at a time, even if you are hungry.    If you eat, avoid fatty, greasy, spicy, or fried foods.    Don t eat dairy foods or drink milk if you have diarrhea. These can make diarrhea worse.  The first 24 hours you can try:    Oral rehydration solutions, available at grocery stores and pharmacies. Sports drinks are not a good choice if you are very dehydrated. They have too much sugar and not enough electrolytes.    Soft drinks without caffeine    Ginger ale    Water (plain or flavored)    Decaf tea or coffee    Clear broth, consommé, or bouillon    Gelatin, ice pops, or frozen fruit juice bars   The second 24 hours, if you are feeling better, you can  add:    Hot cereal, plain toast, bread, rolls, or crackers    Plain noodles, rice, mashed potatoes, chicken noodle soup, or rice soup    Unsweetened canned fruit (no pineapple)    Bananas  As you recover:    Limit fat intake to less than 15 grams per day. Don t eat margarine, butter, oils, mayonnaise, sauces, gravies, fried foods, peanut butter, meat, poultry, or fish.    Limit fiber. Don t eat raw or cooked vegetables, fresh fruits except bananas, and bran cereals.    Limit caffeine and chocolate.    Don t use spices or seasonings except salt.    Resume a normal diet over time, as you feel better and your symptoms improve.    If the symptoms come back, go back to a simple diet or clear liquids.  Follow-up care  Follow up with your healthcare provider, or as advised. If a stool sample was taken or cultures were done, call the healthcare provider for the results as instructed.  Call 911  Call 911 if you have any of these symptoms:    Trouble breathing    Confusion    Extreme drowsiness or trouble walking    Loss of consciousness    Rapid heart rate    Chest pain    Stiff neck    Seizure  When to seek medical advice  Call your healthcare provider right away if any of these occur:    Abdominal pain that gets worse    Constant lower right abdominal pain    Continued vomiting and inability to keep liquids down    Diarrhea more than 5 times a day    Blood in vomit or stool    Dark urine or no urine for 8 hours, dry mouth and tongue, tiredness, weakness, or dizziness    New rash    You don t get better in 2 to 3 days    Fever of 100.4 F (38 C) or higher, or as directed by your healthcare provider    You have new symptoms of arthritis  [employer] last reviewed this educational content on 6/1/2018 2000-2021 [employer]. All rights reserved. This information is not intended as a substitute for professional medical care. Always follow your healthcare professional's instructions.             Follow up with  primary care provider with any problems, questions or concerns or if symptoms worsen or fail to improve. Patient agreed to plan and verbalized understanding.    CANDE [facility]

## 2022-03-07 NOTE — TELEPHONE ENCOUNTER
Pt called in states she has 14 diarrhea stool today.  The symptom started today in the morning.  The stool is watery.  No vomit.  has abdominal 6/10 on the scale.  The Pt is drinking fluid okay.  Not travel out side the country.  Not used antibiotic recently.  No fever, no blood in stool.  Not pregnant.  The disposition is to be seen today at the [facility] or ED.  Care advice given per protocol.  Patient agrees with care advice given.   Agreed to call back if he has additional symptoms or questions.      Sushil Mo [place] Nurse Advisor 3/7/2022 1:22 PM        Reason for Disposition    Constant abdominal pain lasting > 2 hours    Additional Information    Negative: Shock suspected (e.g., cold/pale/clammy skin, too weak to stand, low BP, rapid pulse)    Negative: Difficult to awaken or acting confused (e.g., disoriented, slurred speech)    Negative: Sounds like a life-threatening emergency to the triager    Negative: Vomiting also present and worse than the diarrhea    Negative: Blood in stool and without diarrhea    Negative: SEVERE abdominal pain (e.g., excruciating) and present > 1 hour    Negative: SEVERE abdominal pain and age > 60    Negative: Bloody, black, or tarry bowel movements (Exception: chronic-unchanged black-grey bowel movements and is taking iron pills or Pepto-bismol)    Negative: SEVERE diarrhea (e.g., 7 or more times / day more than normal) and age > 60 years    Protocols used: DIARRHEA-A-OH

## 2022-05-04 ENCOUNTER — TRANSFERRED RECORDS (OUTPATIENT)
Dept: HEALTH INFORMATION MANAGEMENT | Facility: CLINIC | Age: 37
End: 2022-05-04
Payer: OTHER GOVERNMENT

## 2022-05-14 ENCOUNTER — HEALTH MAINTENANCE LETTER (OUTPATIENT)
Age: 37
End: 2022-05-14

## 2022-05-20 ENCOUNTER — TRANSFERRED RECORDS (OUTPATIENT)
Dept: HEALTH INFORMATION MANAGEMENT | Facility: CLINIC | Age: 37
End: 2022-05-20
Payer: OTHER GOVERNMENT

## 2022-08-01 ENCOUNTER — OFFICE VISIT (OUTPATIENT)
Dept: URGENT CARE | Facility: URGENT CARE | Age: 37
End: 2022-08-01
Payer: OTHER GOVERNMENT

## 2022-08-01 VITALS
SYSTOLIC BLOOD PRESSURE: 118 MMHG | DIASTOLIC BLOOD PRESSURE: 72 MMHG | OXYGEN SATURATION: 99 % | BODY MASS INDEX: 36.9 KG/M2 | WEIGHT: 215 LBS | HEART RATE: 73 BPM | TEMPERATURE: 98.5 F

## 2022-08-01 DIAGNOSIS — Z20.818 STREPTOCOCCUS EXPOSURE: ICD-10-CM

## 2022-08-01 DIAGNOSIS — R07.0 THROAT PAIN: Primary | ICD-10-CM

## 2022-08-01 LAB
DEPRECATED S PYO AG THROAT QL EIA: NEGATIVE
GROUP A STREP BY PCR: NOT DETECTED

## 2022-08-01 PROCEDURE — U0005 INFEC AGEN DETEC AMPLI PROBE: HCPCS | Performed by: FAMILY MEDICINE

## 2022-08-01 PROCEDURE — 99213 OFFICE O/P EST LOW 20 MIN: CPT | Performed by: FAMILY MEDICINE

## 2022-08-01 PROCEDURE — 87651 STREP A DNA AMP PROBE: CPT | Performed by: FAMILY MEDICINE

## 2022-08-01 PROCEDURE — U0003 INFECTIOUS AGENT DETECTION BY NUCLEIC ACID (DNA OR RNA); SEVERE ACUTE RESPIRATORY SYNDROME CORONAVIRUS 2 (SARS-COV-2) (CORONAVIRUS DISEASE [COVID-19]), AMPLIFIED PROBE TECHNIQUE, MAKING USE OF HIGH THROUGHPUT TECHNOLOGIES AS DESCRIBED BY CMS-2020-01-R: HCPCS | Performed by: FAMILY MEDICINE

## 2022-08-01 NOTE — PROGRESS NOTES
Chief Complaint   Patient presents with     Pharyngitis     ST, some congestion, HA x this AM and getting worse  - taking stephen             Leah was seen today for pharyngitis.    Diagnoses and all orders for this visit:    Throat pain  -     Streptococcus A Rapid Scr w Reflx to PCR - Lab Collect; Future  -     Streptococcus A Rapid Scr w Reflx to PCR - Lab Collect  -     Group A Streptococcus PCR Throat Swab  -     Symptomatic; Auto-generated order COVID-19 Virus (Coronavirus) by PCR; Future  -     Symptomatic; Auto-generated order COVID-19 Virus (Coronavirus) by PCR Nose    Streptococcus exposure          Symptomatic treat with gargles, lozenges, and OTC analgesic as needed. Follow-up with primary clinic if not improving.    D/d- strep , covid , viral pharyngitis ,    SUBJECTIVE:  Leah Cueto is a 36 year old female with a chief complaint of sore throat.  Onset of symptoms was 1 day(s) ago.    Course of illness: sudden onset.  Severity moderate  Current and Associated symptoms: sore throat and headache  Treatment measures tried include Tylenol/Ibuprofen.  Predisposing factors include exposure to strep.    Past Medical History:   Diagnosis Date     Numbness and tingling     [place] numbness and tingling to legs/ft     Other chronic pain     back     Current Outpatient Medications   Medication Sig Dispense Refill     sertraline (ZOLOFT) 100 MG tablet Take 100 mg by mouth daily       valACYclovir (VALTREX) 500 MG tablet Take 500 mg by mouth 2 times daily       calcium carbonate 600 mg-vitamin D 400 units (CALTRATE) 600-400 MG-UNIT per tablet Take 1 tablet by mouth 2 times daily  (Patient not taking: No sig reported)       Cholecalciferol (VITAMIN D3 PO) Take 2,000 Units by mouth 2 times daily (Patient not taking: No sig reported)       Docosahexaenoic Acid (DHA COMPLETE PO) Take 1 tablet by mouth daily  (Patient not taking: No sig reported)       folic acid (FOLVITE) 400 MCG tablet Take 2 mg by mouth 2 times  daily  (Patient not taking: No sig reported)       Melatonin 10 MG TABS tablet Take 10 mg by mouth nightly as needed for sleep (Patient not taking: No sig reported)       ondansetron (ZOFRAN-ODT) 4 MG ODT tab Take 1 tablet (4 mg) by mouth every 6 hours as needed for nausea (Patient not taking: Reported on 8/1/2022) 10 tablet 0     oxyCODONE (ROXICODONE) 5 MG tablet Take 1 tablet (5 mg) by mouth every 6 hours as needed for breakthrough pain (Patient not taking: No sig reported) 6 tablet 0     PRENATAL VITAMINS PO Take 1 tablet by mouth At Bedtime  (Patient not taking: No sig reported)       Social History     Tobacco Use     Smoking status: Former Smoker     Smokeless tobacco: Never Used     Tobacco comment: quit 12 yrs ago   Substance Use Topics     Alcohol use: Yes     Comment: 2x/ week       ROS:  Review of systems negative except as stated above.    OBJECTIVE:   /72 (BP Location: Right arm, Patient Position: Chair, Cuff Size: Adult Regular)   Pulse 73   Temp 98.5  F (36.9  C) (Oral)   Wt 97.5 kg (215 lb)   LMP 07/28/2022 (Exact Date)   SpO2 99%   Breastfeeding No   BMI 36.90 kg/m    GENERAL APPEARANCE: healthy, alert and no distress  EYES: EOMI,  PERRL, conjunctiva clear  HENT: ear canals and TM's normal.  Nose normal.  Pharynx erythematous with no  exudate noted.  NECK: supple, non-tender to palpation, no adenopathy noted  RESP: lungs clear to auscultation - no rales, rhonchi or wheezes  CV: regular rates and rhythm, normal S1 S2, no murmur noted  PSYCH: mentation appears normal    Rapid Strep test is negative; await throat culture results.    Letitia Saini MD

## 2022-08-02 LAB — SARS-COV-2 RNA RESP QL NAA+PROBE: NEGATIVE

## 2022-09-03 ENCOUNTER — HEALTH MAINTENANCE LETTER (OUTPATIENT)
Age: 37
End: 2022-09-03

## 2022-09-04 ENCOUNTER — APPOINTMENT (OUTPATIENT)
Dept: CT IMAGING | Facility: CLINIC | Age: 37
End: 2022-09-04
Attending: EMERGENCY MEDICINE
Payer: OTHER GOVERNMENT

## 2022-09-04 ENCOUNTER — HOSPITAL ENCOUNTER (EMERGENCY)
Facility: CLINIC | Age: 37
Discharge: HOME | End: 2022-09-05
Attending: EMERGENCY MEDICINE
Payer: OTHER GOVERNMENT

## 2022-09-04 DIAGNOSIS — R11.2 NAUSEA VOMITING AND DIARRHEA: ICD-10-CM

## 2022-09-04 DIAGNOSIS — R91.8 PULMONARY NODULES: ICD-10-CM

## 2022-09-04 DIAGNOSIS — R19.7 NAUSEA VOMITING AND DIARRHEA: ICD-10-CM

## 2022-09-04 LAB
ALBUMIN SERPL BCG-MCNC: 4.1 G/DL (ref 3.5–5.2)
ALBUMIN UR-MCNC: NEGATIVE MG/DL
ALP SERPL-CCNC: 86 U/L (ref 35–104)
ALT SERPL W P-5'-P-CCNC: 15 U/L (ref 10–35)
ANION GAP SERPL CALCULATED.3IONS-SCNC: 10 MMOL/L (ref 7–15)
APPEARANCE UR: CLEAR
AST SERPL W P-5'-P-CCNC: 21 U/L (ref 10–35)
BACTERIA #/AREA URNS HPF: ABNORMAL /HPF
BILIRUB SERPL-MCNC: 0.4 MG/DL
BILIRUB UR QL STRIP: NEGATIVE
BUN SERPL-MCNC: 8.7 MG/DL (ref 6–20)
CALCIUM SERPL-MCNC: 8.4 MG/DL (ref 8.6–10)
CHLORIDE SERPL-SCNC: 101 MMOL/L (ref 98–107)
COLOR UR AUTO: ABNORMAL
CREAT SERPL-MCNC: 0.7 MG/DL (ref 0.51–0.95)
DEPRECATED HCO3 PLAS-SCNC: 25 MMOL/L (ref 22–29)
ERYTHROCYTE [DISTWIDTH] IN BLOOD BY AUTOMATED COUNT: 12.6 % (ref 10–15)
GFR SERPL CREATININE-BSD FRML MDRD: >90 ML/MIN/1.73M2
GLUCOSE SERPL-MCNC: 83 MG/DL (ref 70–99)
GLUCOSE UR STRIP-MCNC: NEGATIVE MG/DL
HCG SERPL QL: NEGATIVE
HCT VFR BLD AUTO: 39.8 % (ref 35–47)
HGB BLD-MCNC: 12.3 G/DL (ref 11.7–15.7)
HGB UR QL STRIP: ABNORMAL
KETONES UR STRIP-MCNC: NEGATIVE MG/DL
LEUKOCYTE ESTERASE UR QL STRIP: NEGATIVE
MCH RBC QN AUTO: 29.9 PG (ref 26.5–33)
MCHC RBC AUTO-ENTMCNC: 30.9 G/DL (ref 31.5–36.5)
MCV RBC AUTO: 97 FL (ref 78–100)
NITRATE UR QL: NEGATIVE
PH UR STRIP: 6 [PH] (ref 5–7)
PLATELET # BLD AUTO: 255 10E3/UL (ref 150–450)
POTASSIUM SERPL-SCNC: 3.9 MMOL/L (ref 3.4–5.3)
PROT SERPL-MCNC: 7.7 G/DL (ref 6.4–8.3)
RBC # BLD AUTO: 4.11 10E6/UL (ref 3.8–5.2)
RBC URINE: 0 /HPF
SODIUM SERPL-SCNC: 136 MMOL/L (ref 136–145)
SP GR UR STRIP: 1.01 (ref 1–1.03)
SQUAMOUS EPITHELIAL: 1 /HPF
UROBILINOGEN UR STRIP-MCNC: NORMAL MG/DL
WBC # BLD AUTO: 7 10E3/UL (ref 4–11)
WBC URINE: <1 /HPF

## 2022-09-04 PROCEDURE — 83690 ASSAY OF LIPASE: CPT | Performed by: EMERGENCY MEDICINE

## 2022-09-04 PROCEDURE — 99285 EMERGENCY DEPT VISIT HI MDM: CPT | Mod: CS,25

## 2022-09-04 PROCEDURE — 36415 COLL VENOUS BLD VENIPUNCTURE: CPT | Performed by: EMERGENCY MEDICINE

## 2022-09-04 PROCEDURE — 258N000003 HC RX IP 258 OP 636: Performed by: EMERGENCY MEDICINE

## 2022-09-04 PROCEDURE — 96360 HYDRATION IV INFUSION INIT: CPT | Mod: 59

## 2022-09-04 PROCEDURE — 81001 URINALYSIS AUTO W/SCOPE: CPT | Performed by: EMERGENCY MEDICINE

## 2022-09-04 PROCEDURE — 85027 COMPLETE CBC AUTOMATED: CPT | Performed by: EMERGENCY MEDICINE

## 2022-09-04 PROCEDURE — 84703 CHORIONIC GONADOTROPIN ASSAY: CPT | Performed by: EMERGENCY MEDICINE

## 2022-09-04 PROCEDURE — 80053 COMPREHEN METABOLIC PANEL: CPT | Performed by: EMERGENCY MEDICINE

## 2022-09-04 PROCEDURE — C9803 HOPD COVID-19 SPEC COLLECT: HCPCS

## 2022-09-04 PROCEDURE — 74177 CT ABD & PELVIS W/CONTRAST: CPT

## 2022-09-04 PROCEDURE — 250N000011 HC RX IP 250 OP 636: Performed by: EMERGENCY MEDICINE

## 2022-09-04 RX ORDER — ONDANSETRON 4 MG/1
4 TABLET, ORALLY DISINTEGRATING ORAL ONCE
Status: COMPLETED | OUTPATIENT
Start: 2022-09-04 | End: 2022-09-04

## 2022-09-04 RX ADMIN — ONDANSETRON 4 MG: 4 TABLET, ORALLY DISINTEGRATING ORAL at 21:04

## 2022-09-04 RX ADMIN — SODIUM CHLORIDE 1000 ML: 9 INJECTION, SOLUTION INTRAVENOUS at 22:55

## 2022-09-04 ASSESSMENT — ENCOUNTER SYMPTOMS
DIFFICULTY URINATING: 0
FEVER: 1
HEMATURIA: 0
DYSURIA: 0
VOMITING: 1
ABDOMINAL PAIN: 1
NAUSEA: 1
DIARRHEA: 1

## 2022-09-04 ASSESSMENT — ACTIVITIES OF DAILY LIVING (ADL): ADLS_ACUITY_SCORE: 35

## 2022-09-05 ENCOUNTER — APPOINTMENT (OUTPATIENT)
Dept: ULTRASOUND IMAGING | Facility: CLINIC | Age: 37
End: 2022-09-05
Attending: EMERGENCY MEDICINE
Payer: OTHER GOVERNMENT

## 2022-09-05 VITALS
HEIGHT: 64 IN | BODY MASS INDEX: 39.27 KG/M2 | SYSTOLIC BLOOD PRESSURE: 133 MMHG | TEMPERATURE: 97.4 F | WEIGHT: 230 LBS | OXYGEN SATURATION: 95 % | RESPIRATION RATE: 16 BRPM | HEART RATE: 64 BPM | DIASTOLIC BLOOD PRESSURE: 77 MMHG

## 2022-09-05 LAB
FLUAV RNA SPEC QL NAA+PROBE: NEGATIVE
FLUBV RNA RESP QL NAA+PROBE: NEGATIVE
LIPASE SERPL-CCNC: 23 U/L (ref 13–60)
RSV RNA SPEC NAA+PROBE: NEGATIVE
SARS-COV-2 RNA RESP QL NAA+PROBE: NEGATIVE

## 2022-09-05 PROCEDURE — 87637 SARSCOV2&INF A&B&RSV AMP PRB: CPT | Performed by: EMERGENCY MEDICINE

## 2022-09-05 PROCEDURE — 93976 VASCULAR STUDY: CPT | Mod: XS

## 2022-09-05 PROCEDURE — 250N000011 HC RX IP 250 OP 636: Performed by: EMERGENCY MEDICINE

## 2022-09-05 RX ORDER — ONDANSETRON 4 MG/1
4 TABLET, ORALLY DISINTEGRATING ORAL EVERY 8 HOURS PRN
Qty: 10 TABLET | Refills: 0 | Status: SHIPPED | OUTPATIENT
Start: 2022-09-05 | End: 2022-09-08

## 2022-09-05 RX ORDER — IOPAMIDOL 755 MG/ML
500 INJECTION, SOLUTION INTRAVASCULAR ONCE
Status: COMPLETED | OUTPATIENT
Start: 2022-09-05 | End: 2022-09-05

## 2022-09-05 RX ADMIN — IOPAMIDOL 100 ML: 755 INJECTION, SOLUTION INTRAVENOUS at 00:07

## 2022-09-05 ASSESSMENT — ACTIVITIES OF DAILY LIVING (ADL)
ADLS_ACUITY_SCORE: 35

## 2022-09-05 NOTE — DISCHARGE INSTRUCTIONS
Please monitor symptoms closely.    Will also require follow-up of your pulmonary nodules with your primary care provider.

## 2022-09-05 NOTE — ED PROVIDER NOTES
"  History   Chief Complaint:  Nausea vomiting diarrhea, abdominal pain    The history is provided by the patient.      Leah Cueto is a 36 year old female with history of anxiety who presents with nausea, diarrhea, and epigastric pain since yesterday and new emesis today.  Yesterday, she had diarrhea (x10) and nausea throughout the day. She reports having slight hematochezia, which she suspects may be due to straining. She describes the associated epigastric pain as if someone was \"crushing my insides.\" She took Zofran, which was prescribed to her approximately 1 month ago after she was seen in the ED for similar symptoms.  She was diagnosed with food poisoning. The Zofran temporarily relieved her symptoms.  She states exacerbation of pain to the left-sided abdomen last night while laying on her left side. When she rolled onto her right side, the pain was alleviated, however, she felt nauseous. This morning, she woke up feeling well until breakfast, when she began having episodes of emesis (x2) after eating.  The nausea and diarrhea persisted as well. She did have a fever (T-max: 102) 5 days ago, which resolved within a day. She denies any exposures to people with similar symptoms. She denies suspecting any food to have caused food poisoning. Her last menses was approximately 1 week ago.  She has a history of 2  sections and a spinal infusion through the abdomen. She denies hematemesis, urinary symptoms, and vaginal bleeding or discharge.  Last sexual activity 2 days prior.  Normally uses menses cup, which was last in place 1 week ago.    Review of Systems   Constitutional: Positive for fever.   Gastrointestinal: Positive for abdominal pain, diarrhea, nausea and vomiting (x2, without blood).   Genitourinary: Negative for difficulty urinating, dysuria, hematuria, vaginal bleeding and vaginal discharge.   All other systems reviewed and are negative.    Allergies:  Erythromycin  Vicodin " "[Hydrocodone-Acetaminophen]    Medications:  Vitamin D3  Folvite  Ondansetron  Sertraline  Valacyclovir     Past Medical History:     Spondylolisthesis of lumbar region  Morbid obesity  Anxiety   Displacement of cervical intervertebral disc     Past Surgical History:     section x2  Lumbar spinal infusion  D&C  Laminectomy   [place] teeth removal      Family History:    Hypothyroidism   CAD  Diabetes  Breast cancer    Social History:  The patient presents alone.  The patient presents in a private vehicle.  PCP: Josee Garcia MD     Physical Exam     Patient Vitals for the past 24 hrs:   BP Temp Temp src Pulse Resp SpO2 Height Weight   22 0000 -- -- -- -- -- 100 % -- --   22 2345 125/75 -- -- 64 -- 100 % -- --   22 2330 122/67 -- -- 64 -- 100 % -- --   22 2315 123/72 -- -- 69 -- 100 % -- --   22 2300 121/71 -- -- 73 -- 99 % -- --   22 2100 (!) 140/103 97.4  F (36.3  C) Temporal 80 16 97 % 1.626 m (5' 4\") 104.3 kg (230 lb)       Physical Exam  General:              Well-nourished              Speaking in full sentences  Eyes:              Conjunctiva without injection or scleral icterus  ENT:              Moist mucous membranes              Nares patent              Pinnae normal  Neck:              Full ROM              No stiffness appreciated  Resp:              Lungs CTAB              No crackles, wheezing or audible rubs              Good air movement  CV:                    Normal rate, regular rhythm              S1 and S2 present              No murmur, gallop or rub  GI:              BS present              Abdomen soft without distention              Tenderness to palpation to epigastric region and left mid/lower abdomen              No CVA tenderness              No guarding or rebound tenderness  Skin:              Warm, dry, well perfused              No rashes or open wounds on exposed skin  MSK:              Moves all extremities           "    No focal deformities or swelling  Neuro:              Alert              Answers questions appropriately              Moves all extremities equally              Gait stable  Psych:              Normal affect, normal mood    Emergency Department Course   Imaging:  CT Abdomen Pelvis w Contrast   Final Result   IMPRESSION:    1.  Multiple pulmonary nodules measuring up to 0.5 cm.   2.  2.9 cm hypodense lesion in the left ovary. Consider ultrasound.      REFERENCE:   Guidelines for Management of Incidental Pulmonary Nodules Detected on CT Images: From the Fleischner Society 2017.    Guidelines apply to incidental nodules in patients who are 35 years or older.   Guidelines do not apply to lung cancer screening, patients with immunosuppression, or patients with known primary cancer.      MULTIPLE NODULES   Nodule size <6 mm   Low-risk patients: No follow-up needed.   High-risk patients: Optional follow-up at 12 months.      Nodule size 6 mm or larger   Low-risk patients: Follow-up CT at 3-6 months, then consider CT at 18-24 months.   High-risk patients: Follow-up CT at 3-6 months, then at 18-24 months if no change.   -Use most suspicious nodule as guide to management.      Consider referral to lung nodule clinic.         US Pelvis Cmplt w Transvag & Doppler LmtPel Duplex Limited    (Results Pending)     Report per radiology    Laboratory:  Labs Ordered and Resulted from Time of ED Arrival to Time of ED Departure   CBC WITH PLATELETS - Abnormal       Result Value    WBC Count 7.0      RBC Count 4.11      Hemoglobin 12.3      Hematocrit 39.8      MCV 97      MCH 29.9      MCHC 30.9 (*)     RDW 12.6      Platelet Count 255     COMPREHENSIVE METABOLIC PANEL - Abnormal    Sodium 136      Potassium 3.9      Creatinine 0.70      Urea Nitrogen 8.7      Chloride 101      Carbon Dioxide (CO2) 25      Anion Gap 10      Glucose 83      Calcium 8.4 (*)     Protein Total 7.7      Albumin 4.1      Bilirubin Total 0.4      Alkaline  Phosphatase 86      AST 21      ALT 15      GFR Estimate >90     ROUTINE UA WITH MICROSCOPIC REFLEX TO CULTURE - Abnormal    Color Urine Straw      Appearance Urine Clear      Glucose Urine Negative      Bilirubin Urine Negative      Ketones Urine Negative      Specific Gravity Urine 1.007      Blood Urine Small (*)     pH Urine 6.0      Protein Albumin Urine Negative      Urobilinogen Urine Normal      Nitrite Urine Negative      Leukocyte Esterase Urine Negative      Bacteria Urine Few (*)     RBC Urine 0      WBC Urine <1      Squamous Epithelials Urine 1     HCG QUALITATIVE PREGNANCY - Normal    hCG Serum Qualitative Negative     LIPASE - Normal    Lipase 23          Emergency Department Course:    Reviewed:  I reviewed nursing notes, vitals, past medical history and Care Everywhere    Assessments:  2239 I obtained history and examined the patient as noted above.    I rechecked the patient and explained findings.     Interventions:  2104 Ondansetron 4 mg PO  2255 NS 1 L IV    Disposition:  Signed out to overnight provider pending US and disposition    Impression & Plan     Medical Decision Making:  Leah Cueto is a 36-year-old female presenting to the ED for evaluation of nausea, vomiting, diarrhea, and abdominal pain.  VS on presentation reveal elevated BP, which improved during ED course.  Nation as noted above, demonstrating tenderness to palpation to the epigastric as well as left abdomen.  Given the presence of vomiting, and diarrhea, certainly this does raise suspicion for gastroenteritis, particularly with diarrhea predominant symptoms.  No fevers or bloody stool to suggest invasive bacterial species.  No history of foreign travel.  No recent antibiotic use that would increase risk for C. difficile.  In light of tenderness advanced imaging was pursued, fortunately revealing unremarkable bowel.  Appendix is not visualized although patient's symptoms are distinctly left-sided, thus arguing against  appendicitis.  Patient was informed of the incidentally noted pulmonary nodules, which will require ongoing surveillance and outpatient follow-up.  An order was placed to assist with establishing primary care provider as an outpatient.  Patient was also informed of the 2.9 cm hypodense lesion to the left ovary.  In light of exacerbated pain earlier today, as well as left-sided discomfort, will plan further evaluation with ultrasound of the pelvis to ensure no evidence of torsion.  Note also made of nonspecific gas in the vaginal canal.  Last sexual activity 2 days previous, and theoretically this could be residual from this.  Reviewed with radiology who saw not other signs of soft tissue infection or soft tissue gas and patient denies any  discomfort or skin changes.  Patient will be signed out to my partner of the overnight shift pending ultrasound and ultimate disposition.  Symptoms are under improved control here in the ED following the above interventions.  She declines offer for analgesia.    Diagnosis:    ICD-10-CM    1. Nausea vomiting and diarrhea  R11.2 Primary Care Referral    R19.7    2. Pulmonary nodules  R91.8 Primary Care Referral   3. Cyst of left ovary  N83.202        Scribe Disclosure:  I, Zuleyka Cueva, am serving as a scribe at 10:38 PM on 9/4/2022 to document services personally performed by Epi Portillo MD based on my observations and the provider's statements to me.          Epi Portillo MD  09/05/22 0130       Epi Portillo MD  09/05/22 0131

## 2022-09-05 NOTE — ED PROVIDER NOTES
I received patient in signout from Dr. Portillo.  Please refer to their complete H&P for further information.  Briefly, patient is a 36-year-old female presenting with nausea, diarrhea and abdominal pain in addition to vomiting.  Labs overall reassuring.  She is not pregnant.  She did undergo formal CT which showed multiple pulmonary nodules, primary physician discussed with patient as well as a hypodense lesion in the left ovary.  At time of signout, pelvic ultrasound as well as final disposition pending.     Labs Ordered and Resulted from Time of ED Arrival to Time of ED Departure   CBC WITH PLATELETS - Abnormal       Result Value    WBC Count 7.0      RBC Count 4.11      Hemoglobin 12.3      Hematocrit 39.8      MCV 97      MCH 29.9      MCHC 30.9 (*)     RDW 12.6      Platelet Count 255     COMPREHENSIVE METABOLIC PANEL - Abnormal    Sodium 136      Potassium 3.9      Creatinine 0.70      Urea Nitrogen 8.7      Chloride 101      Carbon Dioxide (CO2) 25      Anion Gap 10      Glucose 83      Calcium 8.4 (*)     Protein Total 7.7      Albumin 4.1      Bilirubin Total 0.4      Alkaline Phosphatase 86      AST 21      ALT 15      GFR Estimate >90     ROUTINE UA WITH MICROSCOPIC REFLEX TO CULTURE - Abnormal    Color Urine Straw      Appearance Urine Clear      Glucose Urine Negative      Bilirubin Urine Negative      Ketones Urine Negative      Specific Gravity Urine 1.007      Blood Urine Small (*)     pH Urine 6.0      Protein Albumin Urine Negative      Urobilinogen Urine Normal      Nitrite Urine Negative      Leukocyte Esterase Urine Negative      Bacteria Urine Few (*)     RBC Urine 0      WBC Urine <1      Squamous Epithelials Urine 1     HCG QUALITATIVE PREGNANCY - Normal    hCG Serum Qualitative Negative     LIPASE - Normal    Lipase 23     INFLUENZA A/B & SARS-COV2 PCR MULTIPLEX - Normal    Influenza A PCR Negative      Influenza B PCR Negative      RSV PCR Negative      SARS CoV2 PCR Negative         US  Pelvis Cmplt w Transvag & Doppler LmtPel Duplex Limited   Final Result   IMPRESSION:    1. Unremarkable appearance of the uterus and ovaries. Blood flow is visualized in the left ovary. Doppler waveform evaluation of the right ovary is limited due to deep location.   2. A trace amount of free fluid in the left adnexal region and inferior pelvis.       CT Abdomen Pelvis w Contrast   Final Result   IMPRESSION:    1.  Multiple pulmonary nodules measuring up to 0.5 cm.   2.  2.9 cm hypodense lesion in the left ovary. Consider ultrasound.      REFERENCE:   Guidelines for Management of Incidental Pulmonary Nodules Detected on CT Images: From the Fleischner Society 2017.    Guidelines apply to incidental nodules in patients who are 35 years or older.   Guidelines do not apply to lung cancer screening, patients with immunosuppression, or patients with known primary cancer.      MULTIPLE NODULES   Nodule size <6 mm   Low-risk patients: No follow-up needed.   High-risk patients: Optional follow-up at 12 months.      Nodule size 6 mm or larger   Low-risk patients: Follow-up CT at 3-6 months, then consider CT at 18-24 months.   High-risk patients: Follow-up CT at 3-6 months, then at 18-24 months if no change.   -Use most suspicious nodule as guide to management.      Consider referral to lung nodule clinic.           Pelvic ultrasound unremarkable.  Patient's repeat abdominal exam is benign.  I do not feel further emergent work-up is warranted at this point in time.  Planning close PCP follow-up for reevaluation.  She also was subsequently tested for COVID-19 in light of her symptoms though result pending at dispo.  She was discharged home with ODT Zofran and recommended p.o. hydration and brat diet.  Return precautions given.       Felicia Santamaria, DO  09/05/22 0509

## 2022-09-05 NOTE — ED TRIAGE NOTES
"Diarrhea started yesterday morning, x10 yesterday. Vomited today. Pt states \"barely able to keep water down\". Epigastric crushing/stabbing pain last night.       "

## 2022-09-08 ENCOUNTER — OFFICE VISIT (OUTPATIENT)
Dept: FAMILY MEDICINE | Facility: CLINIC | Age: 37
End: 2022-09-08
Attending: EMERGENCY MEDICINE
Payer: OTHER GOVERNMENT

## 2022-09-08 VITALS
SYSTOLIC BLOOD PRESSURE: 119 MMHG | TEMPERATURE: 98.1 F | HEART RATE: 58 BPM | DIASTOLIC BLOOD PRESSURE: 66 MMHG | HEIGHT: 64 IN | RESPIRATION RATE: 16 BRPM | WEIGHT: 236 LBS | BODY MASS INDEX: 40.29 KG/M2

## 2022-09-08 DIAGNOSIS — R31.9 HEMATURIA, UNSPECIFIED TYPE: ICD-10-CM

## 2022-09-08 DIAGNOSIS — R91.8 PULMONARY NODULES: ICD-10-CM

## 2022-09-08 DIAGNOSIS — R19.7 NAUSEA VOMITING AND DIARRHEA: ICD-10-CM

## 2022-09-08 DIAGNOSIS — N83.202 OVARIAN CYST, LEFT: Primary | ICD-10-CM

## 2022-09-08 DIAGNOSIS — R11.2 NAUSEA VOMITING AND DIARRHEA: ICD-10-CM

## 2022-09-08 LAB
ALBUMIN UR-MCNC: NEGATIVE MG/DL
APPEARANCE UR: CLEAR
BACTERIA #/AREA URNS HPF: ABNORMAL /HPF
BILIRUB UR QL STRIP: NEGATIVE
COLOR UR AUTO: YELLOW
ERYTHROCYTE [SEDIMENTATION RATE] IN BLOOD BY WESTERGREN METHOD: 31 MM/HR (ref 0–20)
GLUCOSE UR STRIP-MCNC: NEGATIVE MG/DL
HGB UR QL STRIP: ABNORMAL
KETONES UR STRIP-MCNC: NEGATIVE MG/DL
LEUKOCYTE ESTERASE UR QL STRIP: ABNORMAL
NITRATE UR QL: NEGATIVE
PH UR STRIP: 6 [PH] (ref 5–7)
RBC #/AREA URNS AUTO: ABNORMAL /HPF
SP GR UR STRIP: 1.02 (ref 1–1.03)
UROBILINOGEN UR STRIP-ACNC: 1 E.U./DL
WBC #/AREA URNS AUTO: ABNORMAL /HPF

## 2022-09-08 PROCEDURE — 36415 COLL VENOUS BLD VENIPUNCTURE: CPT | Performed by: FAMILY MEDICINE

## 2022-09-08 PROCEDURE — 85652 RBC SED RATE AUTOMATED: CPT | Performed by: FAMILY MEDICINE

## 2022-09-08 PROCEDURE — 99214 OFFICE O/P EST MOD 30 MIN: CPT | Performed by: FAMILY MEDICINE

## 2022-09-08 PROCEDURE — 81001 URINALYSIS AUTO W/SCOPE: CPT | Performed by: FAMILY MEDICINE

## 2022-09-08 NOTE — PROGRESS NOTES
"  Assessment & Plan     Ovarian cyst, left  Ultrasound scan reviewed , likely functional cyst .  Will obtain follow up scan in 3 months .    - US Pelvic Complete with Transvaginal; Future    Pulmonary nodules  Unsure of the exact etiology .  Will obtain lung CT scan .    - Primary Care Referral  - CT Chest w Contrast; Future  - ESR: Erythrocyte sedimentation rate    Will reach out to patient with CT scan results .    Nausea vomiting and diarrhea  Likely due to viral gastroenteritis now improving .    - Primary Care Referral    Hematuria, unspecified type  - UA with Microscopic - lab collect       BMI:   Estimated body mass index is 40.51 kg/m  as calculated from the following:    Height as of this encounter: 1.626 m (5' 4\").    Weight as of this encounter: 107 kg (236 lb).   Weight management plan: Discussed healthy diet and exercise guidelines      Return in about 4 weeks (around 10/6/2022) for Routine preventive, patient will call, appointment already scheduled.    Aundrea Wynn MD  [facility] TONIO Lynn is a 36 year old, presenting for the following health issues:  ER F/U (Follow-up ER, was seen [facility] on 09/04/2022 for nausea and vomiting)  quit smoking 2007 before that smoke 7 years half a pack day .         HPI     ED/UC Followup:    Facility:  [facility]  Date of visit: 09/04/2022  Reason for visit: nausea and vomiting  Current Status: CT showed lung nodule on lung  Was in ER for abdominal pain nausea , vomiting .  Abdominal CT scan demonstrate pulmonary nodule .  Denies any fatigue , weight loss.    QUIT smoking 2007     Review of Systems   Constitutional, HEENT, cardiovascular, pulmonary, gi and gu systems are negative, except as otherwise noted.      Objective    /66 (BP Location: Right arm, Patient Position: Chair, Cuff Size: Adult Large)   Pulse 58   Temp 98.1  F (36.7  C) (Oral)   Resp 16   Ht 1.626 m (5' 4\")   Wt 107 kg (236 lb)   LMP " 08/26/2022   Breastfeeding No   BMI 40.51 kg/m    Body mass index is 40.51 kg/m .  Physical Exam   RESP: lungs clear to auscultation - no rales, rhonchi or wheezes  CV: regular rate and rhythm, normal S1 S2, no S3 or S4, no murmur, click or rub, no peripheral edema and peripheral pulses strong  ABDOMEN: soft, nontender, no hepatosplenomegaly, no masses and bowel sounds normal

## 2022-09-22 ENCOUNTER — HOSPITAL ENCOUNTER (OUTPATIENT)
Dept: CT IMAGING | Facility: CLINIC | Age: 37
Discharge: HOME | End: 2022-09-22
Attending: FAMILY MEDICINE | Admitting: FAMILY MEDICINE
Payer: OTHER GOVERNMENT

## 2022-09-22 DIAGNOSIS — R91.8 PULMONARY NODULES: ICD-10-CM

## 2022-09-22 PROCEDURE — 250N000009 HC RX 250: Performed by: RADIOLOGY

## 2022-09-22 PROCEDURE — 250N000011 HC RX IP 250 OP 636: Performed by: RADIOLOGY

## 2022-09-22 PROCEDURE — 71260 CT THORAX DX C+: CPT

## 2022-09-22 RX ORDER — IOPAMIDOL 755 MG/ML
500 INJECTION, SOLUTION INTRAVASCULAR ONCE
Status: COMPLETED | OUTPATIENT
Start: 2022-09-22 | End: 2022-09-22

## 2022-09-22 RX ADMIN — SODIUM CHLORIDE 64 ML: 9 INJECTION, SOLUTION INTRAVENOUS at 11:02

## 2022-09-22 RX ADMIN — IOPAMIDOL 90 ML: 755 INJECTION, SOLUTION INTRAVENOUS at 11:02

## 2022-09-26 ENCOUNTER — HOSPITAL ENCOUNTER (OUTPATIENT)
Dept: CT IMAGING | Facility: CLINIC | Age: 37
Discharge: HOME | End: 2022-09-26
Attending: FAMILY MEDICINE | Admitting: FAMILY MEDICINE
Payer: OTHER GOVERNMENT

## 2022-09-26 DIAGNOSIS — R31.9 HEMATURIA, UNSPECIFIED TYPE: ICD-10-CM

## 2022-09-26 PROCEDURE — 74178 CT ABD&PLV WO CNTR FLWD CNTR: CPT

## 2022-09-26 PROCEDURE — 250N000011 HC RX IP 250 OP 636: Performed by: FAMILY MEDICINE

## 2022-09-26 PROCEDURE — 250N000009 HC RX 250: Performed by: FAMILY MEDICINE

## 2022-09-26 RX ORDER — IOPAMIDOL 755 MG/ML
500 INJECTION, SOLUTION INTRAVASCULAR ONCE
Status: COMPLETED | OUTPATIENT
Start: 2022-09-26 | End: 2022-09-26

## 2022-09-26 RX ADMIN — SODIUM CHLORIDE 95 ML: 9 INJECTION, SOLUTION INTRAVENOUS at 14:29

## 2022-09-26 RX ADMIN — IOPAMIDOL 120 ML: 755 INJECTION, SOLUTION INTRAVENOUS at 14:29

## 2022-11-30 ENCOUNTER — OFFICE VISIT (OUTPATIENT)
Dept: UROLOGY | Facility: CLINIC | Age: 37
End: 2022-11-30
Payer: OTHER GOVERNMENT

## 2022-11-30 VITALS
WEIGHT: 225 LBS | HEIGHT: 64 IN | SYSTOLIC BLOOD PRESSURE: 120 MMHG | BODY MASS INDEX: 38.41 KG/M2 | DIASTOLIC BLOOD PRESSURE: 86 MMHG

## 2022-11-30 DIAGNOSIS — R39.14 FEELING OF INCOMPLETE BLADDER EMPTYING: ICD-10-CM

## 2022-11-30 DIAGNOSIS — R31.21 ASYMPTOMATIC MICROSCOPIC HEMATURIA: Primary | ICD-10-CM

## 2022-11-30 LAB
ALBUMIN UR-MCNC: NEGATIVE MG/DL
APPEARANCE UR: CLEAR
BILIRUB UR QL STRIP: NEGATIVE
COLOR UR AUTO: YELLOW
GLUCOSE UR STRIP-MCNC: NEGATIVE MG/DL
HGB UR QL STRIP: ABNORMAL
KETONES UR STRIP-MCNC: ABNORMAL MG/DL
LEUKOCYTE ESTERASE UR QL STRIP: NEGATIVE
NITRATE UR QL: NEGATIVE
PH UR STRIP: 5.5 [PH] (ref 5–7)
RESIDUAL VOLUME (RV) (EXTERNAL): 37
SP GR UR STRIP: 1.01 (ref 1–1.03)
UROBILINOGEN UR STRIP-ACNC: 0.2 E.U./DL

## 2022-11-30 PROCEDURE — 51798 US URINE CAPACITY MEASURE: CPT | Performed by: PHYSICIAN ASSISTANT

## 2022-11-30 PROCEDURE — 81003 URINALYSIS AUTO W/O SCOPE: CPT | Mod: QW | Performed by: PHYSICIAN ASSISTANT

## 2022-11-30 PROCEDURE — 99203 OFFICE O/P NEW LOW 30 MIN: CPT | Mod: 25 | Performed by: PHYSICIAN ASSISTANT

## 2022-11-30 ASSESSMENT — PAIN SCALES - GENERAL: PAINLEVEL: NO PAIN (0)

## 2022-11-30 NOTE — LETTER
11/30/2022       RE: Leah Cueto  [address] [facility] [address]     Dear Colleague,    Thank you for referring your patient, Leah Cueto, to the [facility] UROLOGY CLINIC [place] at [facility]. Please see a copy of my visit note below.    Subjective       REQUESTING PROVIDER   Aundrea Wynn     REASON FOR CONSULT   Microscopic hematuria    HISTORY OF PRESENT ILLNESS   Ms. Cueto is a 37 year old female seen today in the urology clinic in consultation for evaluation of microscopic hematuria.  This is noted on a urinalysis in September which showed 5-10 RBCs per high-powered field.  In discussion with the patient, she believes she was told that she had microscopic hematuria in previous years.  She has never had previous evaluation.    The patient denies any gross hematuria.  Brief smoking history-quit in 2007.  Smoked for approximately 7 years, 0.5 packs/day.  Patient denies any dysuria, urgency, frequency, urinary tract infections, or nephrolithiasis.  She has nocturia anywhere from 0-2 times.  She generally feels like she empties her bladder during the daytime.  At nighttime, she will feel like she needs to go to the bathroom more frequently when she is lying down.  This is been going on as long as she can remember.  Postvoid residual today is 37 mL.  She does note that she is not particularly bothered by this.  She does note that she has had indwelling Ricci catheter when she has had back surgery.    Her primary provider already ordered a CT urogram which did not show any evidence of renal masses, ureteral masses, or kidney stones.     Blood thinners: No    Retention or clots: Neither    Hematuria Risk Factors:  Age >40: No   Smoking history: yes-brief, as outlined above  Occupational exposure to chemicals or dyes (ie, benzenes, aromatic amines): no  History of urologic disorder or disease: no  History of irritative voiding symptoms:  no  History of urinary tract infection: no  Analgesic abuse: no  History of pelvic irradiation: no    The following portions of the patient's history were reviewed and updated as appropriate: allergies, current medications, past family history, past medical history, past social history, past surgical history and problem list.     REVIEW OF SYSTEMS   Review of Systems   Constitutional: Negative.    Genitourinary: Positive for hematuria (Microscopic). Negative for difficulty urinating, dysuria and frequency.      Per HPI.     Patient Active Problem List   Diagnosis     Spondylolisthesis of lumbar region     Morbid obesity (H)     S/P  section     Anxiety state     Constipation     Displacement of cervical intervertebral disc without myelopathy     Speech impediment      Past Medical History:   Diagnosis Date     Numbness and tingling     [place] numbness and tingling to legs/ft     Other chronic pain     back     STD (sexually transmitted disease)       Past Surgical History:   Procedure Laterality Date     ABDOMEN SURGERY      c section     BACK SURGERY      lumbar spinal fusion     C/SECTION, CLASSICAL        SECTION N/A 03/15/2019    Procedure: REPEAT  SECTION;  Surgeon: Nikkie Goss MD;  Location:  L+D     DILATION AND CURETTAGE SUCTION  2013    Procedure: DILATION AND CURETTAGE SUCTION;  DILATION AND CURETTAGE SUCTION;  Surgeon: Kya Goss MD;  Location:  OR     EXPLORE SPINE, REMOVE HARDWARE, COMBINED  2014    Procedure: COMBINED EXPLORE SPINE, REMOVE HARDWARE;;  Surgeon: Christopher Delgado MD;  Location:  OR     FUSION LUMBAR ANTERIOR TWO LEVELS  2014    Procedure: FUSION LUMBAR ANTERIOR TWO LEVELS;  ANTERIOR LUMBAR FUSION AT L4-5 AND L5-S1 USING AN INTERVERTEBRAL PROSTHESIS AND INFUSE , HARDWARE REMOVAL AND REVISION DECOMPRESSION L4-S-1, POSTERIOR SPINAL FUSION L4-5-S1 USING DEMINERALIZATION BONE MATTRIX AND INSTRUMENTATION (REMOVING  HARDWARE (ARVIND);  "POSTERIOR HARDWARE REVERE INSTRUMENTATION) CONTINENTAL INTERVERTEBRAL PROSTHESIS FROM [facility]     GYN SURGERY      d and c for theraputic      LAMINECTOMY, FUSION LUMBAR TWO LEVELS, COMBINED  2014    Procedure: COMBINED LAMINECTOMY, FUSION LUMBAR TWO LEVELS;;  Surgeon: Christopher Delgado MD;  Location:  OR     wisdom teeth[        Social History:   Former smoker.  Quit .  Works at [employer].    Family History:   Family history of a maternal cousin having nephrolithiasis.  Believes a family member had prostate cancer.  Denies any other known family history of  malignancy.    Objective       PHYSICAL EXAM   /86   Ht 1.626 m (5' 4\")   Wt 102.1 kg (225 lb)   BMI 38.62 kg/m     Physical Exam  Constitutional:       Appearance: Normal appearance.   HENT:      Head: Normocephalic.      Nose: Nose normal.   Eyes:      General: No scleral icterus.  Pulmonary:      Effort: Pulmonary effort is normal.   Abdominal:      General: There is no distension.   Musculoskeletal:         General: Normal range of motion.   Skin:     Findings: No rash.   Neurological:      General: No focal deficit present.      Mental Status: She is alert and oriented to person, place, and time.   Psychiatric:         Mood and Affect: Mood normal.         Behavior: Behavior normal.          LABORATORY   Recent Labs   Lab Test 22  1448 22  0735   COLOR Yellow Yellow   APPEARANCE Clear Clear   URINEGLC Negative Negative   URINEBILI Negative Negative   URINEKETONE Trace* Negative   SG 1.010 1.020   UBLD Small* Small*   URINEPH 5.5 6.0   PROTEIN Negative Negative   UROBILINOGEN 0.2 1.0   NITRITE Negative Negative   LEUKEST Negative Trace*   RBCU  --  5-10*   WBCU  --  0-5     IMAGING     I personally reviewed the images.     CT UROGRAM WITHOUT AND WITH CONTRAST   2022 2:58 PM      HISTORY: Hematuria, unspecified type.     TECHNIQUE: Multiphasic CT abdomen and pelvis was performed before and  after injection of " 120mL Isovue-370 intravenously. Radiation dose for  this scan was reduced using automated exposure control, adjustment of  the mA and/or kV according to patient size, or iterative  reconstruction technique.     COMPARISON: Pelvic ultrasound 9/5/2022.     FINDINGS:  Lower chest: Lung bases are clear.     Right kidney: No radiodense kidney/ureteral stones, filling defect  within the renal collecting system or hydronephrosis.     Left kidney: No radiodense kidney/ureteral stones, filling defect  within the renal collecting system or hydronephrosis.     Urinary bladder: Distended and unremarkable.     Remainder of the abdomen and pelvis:     Hepatobiliary: No suspicious focal hepatic lesion. The gallbladder is  unremarkable.     Pancreas: No main pancreatic ductal dilatation or definite solid  pancreatic mass.     Spleen: No splenomegaly.     Adrenal glands: No adrenal nodules.     Bowels: No abnormally dilated bowel loops.     Peritoneum: No significant free fluid in the abdomen or pelvis. No  free peritoneal or portal venous gas.     Pelvic organs: There is vaginal cap.     Vascular: Scattered atherosclerotic vascular calcification of the  abdominal aorta.     Lymph nodes: No significant abdominopelvic lymphadenopathy.     Bones and soft tissue: No suspicious osseous lesion. Postsurgical  changes of L5-S2 spinal fusion.                                                                      IMPRESSION: No CT finding to explain patient's symptoms of hematuria.  No evidence of radiodense kidney/ureteral stones, filling defect  within the renal collecting system or hydronephrosis.    TESTING  PVR: 37 mL    Assessment & Plan    1. Asymptomatic microscopic hematuria    2. Feeling of incomplete bladder emptying      I had the pleasure today of meeting with Ms. Cueto to discuss her microscopic hematuria.  The differential diagnosis at this point includes stone disease, infection, vaginal sources, urothelial malignancy, renal  disorder versus another yet unknown diagnosis.  Many times, we do not find a cause, but we should rule out bad causes.    At this time, recommend proceeding with comprehensive hematuria evaluation to include:    - CT urogram for upper tract imaging.  This was completed already and has no concerning findings.  We did discuss the 2020 AUA microscopic hematuria guidelines, which would have recommended bladder ultrasound, but CT imaging more comprehensive so no need to order ultrasound imaging.  - Cystoscopy with the first available urologist to evaluate the interior of the bladder. Follow up for hematuria as recommended by urologist performing cystoscopic evaluation.    We also briefly discussed her feelings of urgency and incomplete emptying at nighttime.  She notes that this is not particularly bothersome.  She empties her bladder well with a postvoid residual of 37 mL.  At this time, she would like to not do anything further.    -If urinary symptoms worsen, let us know, and we can consider pelvic floor PT and distraction techniques.    Contact us in the interim with questions, concerns, or changes in symptomatology.    Signed by:     Minal Monet PA-C 11/30/2022 3:33 PM

## 2022-11-30 NOTE — PATIENT INSTRUCTIONS
- Cystoscopy with the urologist to evaluate the interior of the bladder. Follow up as recommended by the urologist.     -If urinary symptoms worsen, let us know, and we can consider pelvic floor PT and distraction techniques.    Contact us in the interim with questions, concerns, or changes in symptomatology.  [phone]

## 2022-11-30 NOTE — PROGRESS NOTES
Subjective      REQUESTING PROVIDER   Aundrea Wynn     REASON FOR CONSULT   Microscopic hematuria    HISTORY OF PRESENT ILLNESS   Ms. Cueto is a 37 year old female seen today in the urology clinic in consultation for evaluation of microscopic hematuria.  This is noted on a urinalysis in September which showed 5-10 RBCs per high-powered field.  In discussion with the patient, she believes she was told that she had microscopic hematuria in previous years.  She has never had previous evaluation.    The patient denies any gross hematuria.  Brief smoking history-quit in 2007.  Smoked for approximately 7 years, 0.5 packs/day.  Patient denies any dysuria, urgency, frequency, urinary tract infections, or nephrolithiasis.  She has nocturia anywhere from 0-2 times.  She generally feels like she empties her bladder during the daytime.  At nighttime, she will feel like she needs to go to the bathroom more frequently when she is lying down.  This is been going on as long as she can remember.  Postvoid residual today is 37 mL.  She does note that she is not particularly bothered by this.  She does note that she has had indwelling Ricci catheter when she has had back surgery.    Her primary provider already ordered a CT urogram which did not show any evidence of renal masses, ureteral masses, or kidney stones.     Blood thinners: No    Retention or clots: Neither    Hematuria Risk Factors:  Age >40: No   Smoking history: yes-brief, as outlined above  Occupational exposure to chemicals or dyes (ie, benzenes, aromatic amines): no  History of urologic disorder or disease: no  History of irritative voiding symptoms: no  History of urinary tract infection: no  Analgesic abuse: no  History of pelvic irradiation: no    The following portions of the patient's history were reviewed and updated as appropriate: allergies, current medications, past family history, past medical history, past social history, past surgical history and problem  list.     REVIEW OF SYSTEMS   Review of Systems   Constitutional: Negative.    Genitourinary: Positive for hematuria (Microscopic). Negative for difficulty urinating, dysuria and frequency.      Per HPI.     Patient Active Problem List   Diagnosis     Spondylolisthesis of lumbar region     Morbid obesity (H)     S/P  section     Anxiety state     Constipation     Displacement of cervical intervertebral disc without myelopathy     Speech impediment      Past Medical History:   Diagnosis Date     Numbness and tingling     [place] numbness and tingling to legs/ft     Other chronic pain     back     STD (sexually transmitted disease)       Past Surgical History:   Procedure Laterality Date     ABDOMEN SURGERY      c section     BACK SURGERY      lumbar spinal fusion     C/SECTION, CLASSICAL        SECTION N/A 03/15/2019    Procedure: REPEAT  SECTION;  Surgeon: Nikkie Goss MD;  Location:  L+D     DILATION AND CURETTAGE SUCTION  2013    Procedure: DILATION AND CURETTAGE SUCTION;  DILATION AND CURETTAGE SUCTION;  Surgeon: Kya Goss MD;  Location:  OR     EXPLORE SPINE, REMOVE HARDWARE, COMBINED  2014    Procedure: COMBINED EXPLORE SPINE, REMOVE HARDWARE;;  Surgeon: Christopher Delgado MD;  Location:  OR     FUSION LUMBAR ANTERIOR TWO LEVELS  2014    Procedure: FUSION LUMBAR ANTERIOR TWO LEVELS;  ANTERIOR LUMBAR FUSION AT L4-5 AND L5-S1 USING AN INTERVERTEBRAL PROSTHESIS AND INFUSE , HARDWARE REMOVAL AND REVISION DECOMPRESSION L4-S-1, POSTERIOR SPINAL FUSION L4-5-S1 USING DEMINERALIZATION BONE MATTRIX AND INSTRUMENTATION (REMOVING  HARDWARE (ARVIND); POSTERIOR HARDWARE REVERE INSTRUMENTATION) CONTINENTAL INTERVERTEBRAL PROSTHESIS FROM [facility]     GYN SURGERY      d and c for theraputic      LAMINECTOMY, FUSION LUMBAR TWO LEVELS, COMBINED  2014    Procedure: COMBINED LAMINECTOMY, FUSION LUMBAR TWO LEVELS;;  Surgeon: Christopher Delgado MD;  Location:   "OR     wisdom teeth[        Social History:   Former smoker.  Quit 2007.  Works at [employer].    Family History:   Family history of a maternal cousin having nephrolithiasis.  Believes a family member had prostate cancer.  Denies any other known family history of  malignancy.    Objective      PHYSICAL EXAM   /86   Ht 1.626 m (5' 4\")   Wt 102.1 kg (225 lb)   BMI 38.62 kg/m     Physical Exam  Constitutional:       Appearance: Normal appearance.   HENT:      Head: Normocephalic.      Nose: Nose normal.   Eyes:      General: No scleral icterus.  Pulmonary:      Effort: Pulmonary effort is normal.   Abdominal:      General: There is no distension.   Musculoskeletal:         General: Normal range of motion.   Skin:     Findings: No rash.   Neurological:      General: No focal deficit present.      Mental Status: She is alert and oriented to person, place, and time.   Psychiatric:         Mood and Affect: Mood normal.         Behavior: Behavior normal.          LABORATORY   Recent Labs   Lab Test 11/30/22  1448 09/08/22  0735   COLOR Yellow Yellow   APPEARANCE Clear Clear   URINEGLC Negative Negative   URINEBILI Negative Negative   URINEKETONE Trace* Negative   SG 1.010 1.020   UBLD Small* Small*   URINEPH 5.5 6.0   PROTEIN Negative Negative   UROBILINOGEN 0.2 1.0   NITRITE Negative Negative   LEUKEST Negative Trace*   RBCU  --  5-10*   WBCU  --  0-5     IMAGING     I personally reviewed the images.     CT UROGRAM WITHOUT AND WITH CONTRAST   9/26/2022 2:58 PM      HISTORY: Hematuria, unspecified type.     TECHNIQUE: Multiphasic CT abdomen and pelvis was performed before and  after injection of 120mL Isovue-370 intravenously. Radiation dose for  this scan was reduced using automated exposure control, adjustment of  the mA and/or kV according to patient size, or iterative  reconstruction technique.     COMPARISON: Pelvic ultrasound 9/5/2022.     FINDINGS:  Lower chest: Lung bases are clear.     Right kidney: No " radiodense kidney/ureteral stones, filling defect  within the renal collecting system or hydronephrosis.     Left kidney: No radiodense kidney/ureteral stones, filling defect  within the renal collecting system or hydronephrosis.     Urinary bladder: Distended and unremarkable.     Remainder of the abdomen and pelvis:     Hepatobiliary: No suspicious focal hepatic lesion. The gallbladder is  unremarkable.     Pancreas: No main pancreatic ductal dilatation or definite solid  pancreatic mass.     Spleen: No splenomegaly.     Adrenal glands: No adrenal nodules.     Bowels: No abnormally dilated bowel loops.     Peritoneum: No significant free fluid in the abdomen or pelvis. No  free peritoneal or portal venous gas.     Pelvic organs: There is vaginal cap.     Vascular: Scattered atherosclerotic vascular calcification of the  abdominal aorta.     Lymph nodes: No significant abdominopelvic lymphadenopathy.     Bones and soft tissue: No suspicious osseous lesion. Postsurgical  changes of L5-S2 spinal fusion.                                                                      IMPRESSION: No CT finding to explain patient's symptoms of hematuria.  No evidence of radiodense kidney/ureteral stones, filling defect  within the renal collecting system or hydronephrosis.    TESTING  PVR: 37 mL    Assessment & Plan    1. Asymptomatic microscopic hematuria    2. Feeling of incomplete bladder emptying      I had the pleasure today of meeting with Ms. Cueto to discuss her microscopic hematuria.  The differential diagnosis at this point includes stone disease, infection, vaginal sources, urothelial malignancy, renal disorder versus another yet unknown diagnosis.  Many times, we do not find a cause, but we should rule out bad causes.    At this time, recommend proceeding with comprehensive hematuria evaluation to include:    - CT urogram for upper tract imaging.  This was completed already and has no concerning findings.  We did  discuss the 2020 AUA microscopic hematuria guidelines, which would have recommended bladder ultrasound, but CT imaging more comprehensive so no need to order ultrasound imaging.  - Cystoscopy with the first available urologist to evaluate the interior of the bladder. Follow up for hematuria as recommended by urologist performing cystoscopic evaluation.    We also briefly discussed her feelings of urgency and incomplete emptying at nighttime.  She notes that this is not particularly bothersome.  She empties her bladder well with a postvoid residual of 37 mL.  At this time, she would like to not do anything further.    -If urinary symptoms worsen, let us know, and we can consider pelvic floor PT and distraction techniques.    Contact us in the interim with questions, concerns, or changes in symptomatology.    Signed by:     Minal Monet PA-C 11/30/2022 3:33 PM

## 2022-11-30 NOTE — NURSING NOTE
Chief Complaint   Patient presents with     Microscopic hematuria     Pt states she feels like she does not always empty her bladder, pt states if she waits after urination she will urinate more.    PVR:  37 mL    Socorro Girard, CMA

## 2022-12-01 ASSESSMENT — ENCOUNTER SYMPTOMS
HEMATURIA: 1
CONSTITUTIONAL NEGATIVE: 1
DIFFICULTY URINATING: 0
FREQUENCY: 0
DYSURIA: 0

## 2022-12-08 ENCOUNTER — ANCILLARY PROCEDURE (OUTPATIENT)
Dept: ULTRASOUND IMAGING | Facility: CLINIC | Age: 37
End: 2022-12-08
Attending: FAMILY MEDICINE
Payer: OTHER GOVERNMENT

## 2022-12-08 DIAGNOSIS — N83.202 OVARIAN CYST, LEFT: ICD-10-CM

## 2022-12-08 PROCEDURE — 76856 US EXAM PELVIC COMPLETE: CPT | Performed by: FAMILY MEDICINE

## 2022-12-08 PROCEDURE — 76830 TRANSVAGINAL US NON-OB: CPT | Performed by: FAMILY MEDICINE

## 2023-01-19 ENCOUNTER — OFFICE VISIT (OUTPATIENT)
Dept: UROLOGY | Facility: CLINIC | Age: 38
End: 2023-01-19
Payer: OTHER GOVERNMENT

## 2023-01-19 VITALS
HEIGHT: 64 IN | WEIGHT: 225 LBS | BODY MASS INDEX: 38.41 KG/M2 | SYSTOLIC BLOOD PRESSURE: 126 MMHG | DIASTOLIC BLOOD PRESSURE: 76 MMHG

## 2023-01-19 DIAGNOSIS — R31.21 ASYMPTOMATIC MICROSCOPIC HEMATURIA: Primary | ICD-10-CM

## 2023-01-19 DIAGNOSIS — N30.80 CYSTITIS CYSTICA: ICD-10-CM

## 2023-01-19 DIAGNOSIS — N32.89 BLADDER SQUAMOUS METAPLASIA: ICD-10-CM

## 2023-01-19 LAB
ALBUMIN UR-MCNC: NEGATIVE MG/DL
APPEARANCE UR: CLEAR
BILIRUB UR QL STRIP: NEGATIVE
COLOR UR AUTO: YELLOW
GLUCOSE UR STRIP-MCNC: NEGATIVE MG/DL
HGB UR QL STRIP: ABNORMAL
KETONES UR STRIP-MCNC: NEGATIVE MG/DL
LEUKOCYTE ESTERASE UR QL STRIP: NEGATIVE
NITRATE UR QL: NEGATIVE
PH UR STRIP: 6.5 [PH] (ref 5–7)
SP GR UR STRIP: 1.02 (ref 1–1.03)
UROBILINOGEN UR STRIP-ACNC: 1 E.U./DL

## 2023-01-19 PROCEDURE — 81003 URINALYSIS AUTO W/O SCOPE: CPT | Mod: QW | Performed by: STUDENT IN AN ORGANIZED HEALTH CARE EDUCATION/TRAINING PROGRAM

## 2023-01-19 PROCEDURE — 52000 CYSTOURETHROSCOPY: CPT | Performed by: STUDENT IN AN ORGANIZED HEALTH CARE EDUCATION/TRAINING PROGRAM

## 2023-01-19 PROCEDURE — 99213 OFFICE O/P EST LOW 20 MIN: CPT | Mod: 25 | Performed by: STUDENT IN AN ORGANIZED HEALTH CARE EDUCATION/TRAINING PROGRAM

## 2023-01-19 RX ORDER — LIDOCAINE HYDROCHLORIDE 20 MG/ML
JELLY TOPICAL ONCE
Status: COMPLETED | OUTPATIENT
Start: 2023-01-19 | End: 2023-01-19

## 2023-01-19 RX ADMIN — LIDOCAINE HYDROCHLORIDE 5 ML: 20 JELLY TOPICAL at 10:35

## 2023-01-19 ASSESSMENT — PAIN SCALES - GENERAL: PAINLEVEL: NO PAIN (0)

## 2023-01-19 NOTE — LETTER
"1/19/2023       RE: Leah Cueto  [address] [facility] [address]     Dear Colleague,    Thank you for referring your patient, Leah Cueto, to the [facility] UROLOGY CLINIC [place] at [facility]. Please see a copy of my visit note below.    CHIEF COMPLAINT   Leah Cueto who is a 37 year old female returns today for follow-up of microhematuria.      HPI   Leah Cueto is a 37 year old female who presents with a history of microhematuria.  See consult 11/30/2022 w/ CARLEY for further details    PHYSICAL EXAM  Patient is a 37 year old  female   Vitals: Blood pressure 126/76, height 1.626 m (5' 4\"), weight 102.1 kg (225 lb), not currently breastfeeding.  Body mass index is 38.62 kg/m .  General Appearance Adult:   Alert, no acute distress, oriented  HENT: throat/mouth:normal, good dentition  Lungs: no respiratory distress, or pursed lip breathing  Heart: No obvious jugular venous distension present  Abdomen: nondistended. obese  Musculoskeltal: extremities normal, no peripheral edema  Skin: no suspicious lesions or rashes  Neuro: Alert, oriented, speech and mentation normal  Psych: affect and mood normal  Gait: Normal  : normal female external genitalia    All pertinent imaging reviewed:    Ct urogram 9/26/2022  IMPRESSION: No CT finding to explain patient's symptoms of hematuria.  No evidence of radiodense kidney/ureteral stones, filling defect  within the renal collecting system or hydronephrosis.         Reviewed personally. No concerning filling defect. No stone    PRE-PROCEDURE DIAGNOSIS: microhematuria    POST-PROCEDURE DIAGNOSIS: microhematuria    PROCEDURE: Cystoscopy    DESCRIPTION OF PROCEDURE: After informed consent was obtained, the patient was brought to the procedure room where she was placed in the lithotomy position with all pressure points well padded.  The vulva was prepped and draped in sterile fashion. A flexible " cystoscope was introduced through a well-lubricated urethra.     The trigone had squamous metaplasia, considered a benign finding in this patient demographic.  Orthotopic bilateral single ureteral orifices. The bladder itself had scattered cystitis cystica but otherwise unremarkable with no concerning urothelial lesions. No bladder stones.    The flexible cystoscope was removed and the findings were described to the patient.     ASSESSMENT AND PLAN: 36 yo F with microhematuria, found to have squamous metaplasia and cystitis cystica on cystoscopy. Squamous metaplasia considered benign finding. Cystitis cystica probably related to chronic inflammation; she doesn't recurrent UTI. I advised her to ensure that she keeps her bladder empty and remain hydrated to avoid UTI.     - continue annual urinalysis with microscopic with PCP  - if persistent microhematuria defined as >=3 rbc/hpf, needs repeat workup every 3-5 years  - if increasing rbc/hpf, or if develops gross hematuria or worsening urinary symptoms, needs to be seen back earlier    Lacho Ramey MD   [facility]  [phone] clinic phone

## 2023-01-19 NOTE — PROGRESS NOTES
"CHIEF COMPLAINT   Leah Cueto who is a 37 year old female returns today for follow-up of microhematuria.      HPI   Leah Cueto is a 37 year old female who presents with a history of microhematuria.  See consult 11/30/2022 w/ CARLEY for further details    PHYSICAL EXAM  Patient is a 37 year old  female   Vitals: Blood pressure 126/76, height 1.626 m (5' 4\"), weight 102.1 kg (225 lb), not currently breastfeeding.  Body mass index is 38.62 kg/m .  General Appearance Adult:   Alert, no acute distress, oriented  HENT: throat/mouth:normal, good dentition  Lungs: no respiratory distress, or pursed lip breathing  Heart: No obvious jugular venous distension present  Abdomen: nondistended. obese  Musculoskeltal: extremities normal, no peripheral edema  Skin: no suspicious lesions or rashes  Neuro: Alert, oriented, speech and mentation normal  Psych: affect and mood normal  Gait: Normal  : normal female external genitalia    All pertinent imaging reviewed:    Ct urogram 9/26/2022  IMPRESSION: No CT finding to explain patient's symptoms of hematuria.  No evidence of radiodense kidney/ureteral stones, filling defect  within the renal collecting system or hydronephrosis.         Reviewed personally. No concerning filling defect. No stone    PRE-PROCEDURE DIAGNOSIS: microhematuria    POST-PROCEDURE DIAGNOSIS: microhematuria    PROCEDURE: Cystoscopy    DESCRIPTION OF PROCEDURE: After informed consent was obtained, the patient was brought to the procedure room where she was placed in the lithotomy position with all pressure points well padded.  The vulva was prepped and draped in sterile fashion. A flexible cystoscope was introduced through a well-lubricated urethra.     The trigone had squamous metaplasia, considered a benign finding in this patient demographic.  Orthotopic bilateral single ureteral orifices. The bladder itself had scattered cystitis cystica but otherwise unremarkable with no concerning urothelial " lesions. No bladder stones.    The flexible cystoscope was removed and the findings were described to the patient.     ASSESSMENT AND PLAN: 38 yo F with microhematuria, found to have squamous metaplasia and cystitis cystica on cystoscopy. Squamous metaplasia considered benign finding. Cystitis cystica probably related to chronic inflammation; she doesn't recurrent UTI. I advised her to ensure that she keeps her bladder empty and remain hydrated to avoid UTI.     - continue annual urinalysis with microscopic with PCP  - if persistent microhematuria defined as >=3 rbc/hpf, needs repeat workup every 3-5 years  - if increasing rbc/hpf, or if develops gross hematuria or worsening urinary symptoms, needs to be seen back earlier    Lacho Ramey MD   [facility]  [phone] clinic phone

## 2023-01-19 NOTE — PATIENT INSTRUCTIONS

## 2023-01-19 NOTE — NURSING NOTE
Chief Complaint   Patient presents with     Microscopic hematuria     Pt here for in office cystoscopy     Prior to the start of the procedure and with procedural staff participation, I verbally confirmed the patient s identity using two indicators, relevant allergies, that the procedure was appropriate and matched the consent or emergent situation, and that the correct equipment/implants were available. Immediately prior to starting the procedure I conducted the Time Out with the procedural staff and re-confirmed the patient s name, procedure, and site/side. I have wiped the patient off with the povidone-Iodine solution, draped them,  used Lidocaine hydrochloride jelly, and instilled sterile water into the bladder. (The Joint Commission universal protocol was followed.)  Yes    Sedation (Moderate or Deep): None    5mL 2% lidocaine hydrochloride Urojet instilled into urethra.    NDC# [address]-4  Lot #: [id number]  Expiration Date:  07/24    Socorro Girard CMA

## 2023-05-26 ENCOUNTER — OFFICE VISIT (OUTPATIENT)
Dept: URGENT CARE | Facility: URGENT CARE | Age: 38
End: 2023-05-26
Payer: OTHER GOVERNMENT

## 2023-05-26 VITALS
TEMPERATURE: 98.2 F | RESPIRATION RATE: 14 BRPM | HEART RATE: 64 BPM | BODY MASS INDEX: 38.62 KG/M2 | OXYGEN SATURATION: 99 % | DIASTOLIC BLOOD PRESSURE: 74 MMHG | WEIGHT: 225 LBS | SYSTOLIC BLOOD PRESSURE: 116 MMHG

## 2023-05-26 DIAGNOSIS — R01.1 UNDIAGNOSED CARDIAC MURMURS: ICD-10-CM

## 2023-05-26 DIAGNOSIS — R07.0 THROAT PAIN: ICD-10-CM

## 2023-05-26 DIAGNOSIS — J01.90 ACUTE SINUSITIS WITH SYMPTOMS > 10 DAYS: Primary | ICD-10-CM

## 2023-05-26 LAB — DEPRECATED S PYO AG THROAT QL EIA: NEGATIVE

## 2023-05-26 PROCEDURE — 99213 OFFICE O/P EST LOW 20 MIN: CPT | Performed by: PHYSICIAN ASSISTANT

## 2023-05-26 PROCEDURE — 87651 STREP A DNA AMP PROBE: CPT | Performed by: PHYSICIAN ASSISTANT

## 2023-05-26 NOTE — PROGRESS NOTES
ASSESSMENT/PLAN:     MDM: Likely viral URI with secondary bacterial sinusitis.  Rapid strep negative.  Strep PCR pending.  Declined COVID testing today.  No evidence of respiratory distress or other medical distress.  Incidental finding of left-sided systolic ejection murmur (2/6 systolic ejection murmur at left sternal border).  She is afebrile today, so that is not the etiology.  She is not in any cardiorespiratory distress.  No acute cardiorespiratory symptoms.  Treatment of sinusitis as per below.  I have also advised patient follow-up with her primary care provider regarding new newly diagnosed cardiac murmur.  As she is asymptomatic, I will defer any decision regarding echocardiogram and/or other cardiac follow-up to her primary care team.  Criteria for urgent and emergent follow-up were reviewed.  Also please see below patient after visit summary/plan (which I reviewed with patient verbally and provided in printed form-placed in her MyChart for home review).    AVS/Plan-    May 26, 2023 Urgent Care Plan     In addition to the below, I have advised you make a follow-up visit to see your primary care provider to discuss the heart murmur I heard here today (located in the left sternal border area). Your primary care provider will let you know if you should have an echocardiogram (an ultrasound of your heart) and/or other further evaluation.           - Augmentin antibiotic for sinus infection (if your second Strep test comes back positive, the Augmentin will also cure Strep/no change in antibiotic would be needed)   -Home supportive care   -Ok to alternate over the counter doses Ibuprofen and Tylenol (switch from one to the other every 4 hours) for the next couple of days, as needed for sore throat and fever  -Encourage extra fluids   -Follow-up with primary care or urgent care if no improvement after 5 days of antibiotics, if not fully resolved in 10 days, and sooner if worsening.   -If Strep PCR is positive,  "change toothbrush as discussed to prevent re-infection     (R01.1) Undiagnosed cardiac murmurs    (R07.0) Throat pain  Plan: Streptococcus A Rapid Screen w/Reflex to PCR -         Clinic Collect, Group A Streptococcus PCR         Throat Swab  --------------    SUBJECTIVE:     Leah Cueto 37 year old female who presents to  today for evaluation of nasal congestion, postnasal nasal drainage, sore throat, facial/sinus pain and states she \"taste blood\" intermittently in the back of her throat x2 weeks duration.  This afternoon, she noted a fever for the first time since onset of symptoms (100.4  F).      During exam today, I noted a 2/6 systolic ejection murmur at left sternal border.  Patient tells me nobody has ever told her she had a heart murmur in the past.  She denies any acute chest pain.  Denies any unexplained decrease in exertional tolerance.  Denies any racing or irregular heartbeats.  Denies any syncope or near syncope.  Denies any primary family history of sudden cardiac death.  Denies any known primary family history of congenital cardiac problems.      ROS: No associated severe cough,coughing up of blood, blue lips/fingers/toes, shortness of breath, chest pain, wheezing, abdominal pain, nausea, vomiting, diarrhea, severe body aches, severe headaches, rashes, joint swelling or other acute illness symptoms.        Past Medical History:   Diagnosis Date     Numbness and tingling     [place] numbness and tingling to legs/ft     Other chronic pain     back     STD (sexually transmitted disease)        Patient Active Problem List   Diagnosis     Spondylolisthesis of lumbar region     Morbid obesity (H)     S/P  section     Anxiety state     Constipation     Displacement of cervical intervertebral disc without myelopathy     Speech impediment         Current Outpatient Medications   Medication     sertraline (ZOLOFT) 100 MG tablet     valACYclovir (VALTREX) 500 MG tablet     No current " facility-administered medications for this visit.       Allergies   Allergen Reactions     Erythromycin Nausea and Vomiting     Vicodin [Hydrocodone-Acetaminophen] Nausea and Vomiting             OBJECTIVE:  /74 (BP Location: Right arm, Patient Position: Chair, Cuff Size: Adult Regular)   Pulse 64   Temp 98.2  F (36.8  C) (Oral)   Resp 14   Wt 102.1 kg (225 lb)   LMP  (LMP Unknown)   SpO2 99%   BMI 38.62 kg/m            General appearance: alert and no apparent distress  Skin color is pink and without rash.  HEENT:   Conjunctiva not injected.  Sclera clear.  Left TM is normal: no effusions, no erythema, and normal landmarks.  Right TM is normal: no effusions, no erythema, and normal landmarks.  Nasal mucosa is normal.  Oropharyngeal exam is positive for mild erythema.  No asymmetry. Uvula is midline. No trismus. Voice is clear. No lesions, adenopathy, plaque or exudate.  NECK: Trachea is midline. Neck is supple, FROM. No neck stiffness. No adenopathy  CARDIAC: Normal sinus rhythm.  2/6 systolic ejection murmur is appreciated at left sternal border (heard both sitting and laying today).  No extra heart sounds.  Rhythm is regular.  RESP: No increased work of breathing.  Lung fields are clear to ausculation. No rales, rhonchi, or wheezing.  NEURO: Alert and oriented.  Normal speech and mentation.  CN II/XII grossly intact.  Gait within normal limits.    LE: No LE edema         LAB:      Results for orders placed or performed in visit on 05/26/23   Streptococcus A Rapid Screen w/Reflex to PCR - Clinic Collect     Status: Normal    Specimen: Throat; Swab   Result Value Ref Range    Group A Strep antigen Negative Negative        Strep PCR test result is pending      Declines Covid screening today

## 2023-05-26 NOTE — PATIENT INSTRUCTIONS
May 26, 2023 Urgent Care Plan     In addition to the below, I have advised you make a follow-up visit to see your primary care provider to discuss the heart murmur I heard here today (located in the left sternal border area). Your primary care provider will let you know if you should have an echocardiogram (an ultrasound of your heart) and/or other further evaluation.           - Augmentin antibiotic for sinus infection (if your second Strep test comes back positive, the Augmentin will also cure Strep/no change in antibiotic would be needed)   -Home supportive care   -Ok to alternate over the counter doses Ibuprofen and Tylenol (switch from one to the other every 4 hours) for the next couple of days, as needed for sore throat and fever  -Encourage extra fluids   -Follow-up with primary care or urgent care if no improvement after 5 days of antibiotics, if not fully resolved in 10 days, and sooner if worsening.   -If Strep PCR is positive, change toothbrush as discussed to prevent re-infection

## 2023-05-27 LAB — GROUP A STREP BY PCR: NOT DETECTED

## 2023-06-02 ENCOUNTER — HEALTH MAINTENANCE LETTER (OUTPATIENT)
Age: 38
End: 2023-06-02

## 2023-06-05 ASSESSMENT — ANXIETY QUESTIONNAIRES
GAD7 TOTAL SCORE: 4
IF YOU CHECKED OFF ANY PROBLEMS ON THIS QUESTIONNAIRE, HOW DIFFICULT HAVE THESE PROBLEMS MADE IT FOR YOU TO DO YOUR WORK, TAKE CARE OF THINGS AT HOME, OR GET ALONG WITH OTHER PEOPLE: SOMEWHAT DIFFICULT
8. IF YOU CHECKED OFF ANY PROBLEMS, HOW DIFFICULT HAVE THESE MADE IT FOR YOU TO DO YOUR WORK, TAKE CARE OF THINGS AT HOME, OR GET ALONG WITH OTHER PEOPLE?: SOMEWHAT DIFFICULT
GAD7 TOTAL SCORE: 4
2. NOT BEING ABLE TO STOP OR CONTROL WORRYING: SEVERAL DAYS
7. FEELING AFRAID AS IF SOMETHING AWFUL MIGHT HAPPEN: SEVERAL DAYS
7. FEELING AFRAID AS IF SOMETHING AWFUL MIGHT HAPPEN: SEVERAL DAYS
4. TROUBLE RELAXING: NOT AT ALL
3. WORRYING TOO MUCH ABOUT DIFFERENT THINGS: NOT AT ALL
6. BECOMING EASILY ANNOYED OR IRRITABLE: SEVERAL DAYS
GAD7 TOTAL SCORE: 4
1. FEELING NERVOUS, ANXIOUS, OR ON EDGE: SEVERAL DAYS
5. BEING SO RESTLESS THAT IT IS HARD TO SIT STILL: NOT AT ALL

## 2023-06-08 ENCOUNTER — OFFICE VISIT (OUTPATIENT)
Dept: FAMILY MEDICINE | Facility: CLINIC | Age: 38
End: 2023-06-08
Payer: OTHER GOVERNMENT

## 2023-06-08 VITALS
OXYGEN SATURATION: 97 % | HEIGHT: 64 IN | RESPIRATION RATE: 16 BRPM | WEIGHT: 244 LBS | BODY MASS INDEX: 41.66 KG/M2 | DIASTOLIC BLOOD PRESSURE: 88 MMHG | HEART RATE: 62 BPM | SYSTOLIC BLOOD PRESSURE: 116 MMHG | TEMPERATURE: 98.2 F

## 2023-06-08 DIAGNOSIS — F41.9 ANXIETY: Primary | ICD-10-CM

## 2023-06-08 DIAGNOSIS — R01.1 HEART MURMUR, SYSTOLIC: ICD-10-CM

## 2023-06-08 DIAGNOSIS — B00.1 COLD SORE: ICD-10-CM

## 2023-06-08 PROCEDURE — 99213 OFFICE O/P EST LOW 20 MIN: CPT | Performed by: FAMILY MEDICINE

## 2023-06-08 RX ORDER — SERTRALINE HYDROCHLORIDE 100 MG/1
100 TABLET, FILM COATED ORAL DAILY
Qty: 90 TABLET | Refills: 3 | Status: SHIPPED | OUTPATIENT
Start: 2023-06-08 | End: 2024-06-20

## 2023-06-08 RX ORDER — TRAZODONE HYDROCHLORIDE 50 MG/1
25 TABLET, FILM COATED ORAL AT BEDTIME
Qty: 45 TABLET | Refills: 3 | Status: SHIPPED | OUTPATIENT
Start: 2023-06-08 | End: 2024-09-19

## 2023-06-08 RX ORDER — VALACYCLOVIR HYDROCHLORIDE 500 MG/1
500 TABLET, FILM COATED ORAL 2 TIMES DAILY
Qty: 30 TABLET | Refills: 3 | Status: SHIPPED | OUTPATIENT
Start: 2023-06-08 | End: 2024-05-16

## 2023-06-08 RX ORDER — TRAZODONE HYDROCHLORIDE 50 MG/1
25 TABLET, FILM COATED ORAL AT BEDTIME
COMMUNITY
Start: 2023-02-22 | End: 2023-06-08

## 2023-06-08 ASSESSMENT — ENCOUNTER SYMPTOMS
ABDOMINAL PAIN: 0
BACK PAIN: 0
APPETITE CHANGE: 0
ABDOMINAL DISTENTION: 0
ARTHRALGIAS: 0
FACIAL ASYMMETRY: 0
ACTIVITY CHANGE: 0
AGITATION: 0
DIZZINESS: 0

## 2023-06-08 ASSESSMENT — ANXIETY QUESTIONNAIRES: GAD7 TOTAL SCORE: 4

## 2023-06-08 NOTE — PROGRESS NOTES
"  Assessment & Plan     Anxiety  -Better controlled with Zoloft 150 mg recommend to continue with current medication we discussed lifestyle modification relaxation activities and exercise.  - sertraline (ZOLOFT) 100 MG tablet; Take 1 tablet (100 mg) by mouth daily Take 100mg and 50mg to equal 150mg daily.  - sertraline (ZOLOFT) 50 MG tablet; Take 2 tablets (100 mg) by mouth daily  - traZODone (DESYREL) 50 MG tablet; Take 0.5 tablets (25 mg) by mouth At Bedtime    Cold sore  - valACYclovir (VALTREX) 500 MG tablet; Take 1 tablet (500 mg) by mouth 2 times daily    Heart murmur, systolic  Likely flow murmur will get echocardiogram to rule out any other abnormality.  - Echocardiogram Complete; Future             BMI:   Estimated body mass index is 41.88 kg/m  as calculated from the following:    Height as of this encounter: 1.626 m (5' 4\").    Weight as of this encounter: 110.7 kg (244 lb).   Weight management plan: Discussed healthy diet and exercise guidelines      Aundrea Wynn MD  [facility] is a 37 [facility], presenting for the following health issues:   Follow-Up (5/26/23) and Recheck Medication    History of Present Illness       Mental Health Follow-up:  Patient presents to follow-up on Anxiety.    Patient's anxiety since last visit has been:  Better  The patient is not having other symptoms associated with anxiety.  Any significant life events: other  Patient is feeling anxious or having panic attacks.  Patient has no concerns about alcohol or drug use.    She eats 2-3 servings of fruits and vegetables daily.She consumes 1 sweetened beverage(s) daily.She exercises with enough effort to increase her heart rate 10 to 19 minutes per day.  She exercises with enough effort to increase her heart rate 4 days per week.   She is taking medications regularly.  Today's JEANINE-7 Score: 4         6/5/2023    11:42 AM   JEANINE-7 SCORE   Total Score 4 (minimal anxiety)   Total Score 4 " "        ED/UC Followup:    Facility:  [facility]   Date of visit: 5/26/23  Reason for visit: Sinus  Current Status: Found a heart murmer. Pt has noticed that her muscles have more achy than normal and extra SOB.  Noticed it after COVID in 1/2023.    Sinus is feeling better.         Review of Systems   Constitutional: Negative for activity change and appetite change.   Gastrointestinal: Negative for abdominal distention and abdominal pain.   Musculoskeletal: Negative for arthralgias and back pain.   Neurological: Negative for dizziness and facial asymmetry.   Psychiatric/Behavioral: Negative for agitation and behavioral problems.            Objective    /88   Pulse 62   Temp 98.2  F (36.8  C) (Oral)   Resp 16   Ht 1.626 m (5' 4\")   Wt 110.7 kg (244 lb)   LMP 06/06/2023 (Exact Date)   SpO2 97%   BMI 41.88 kg/m    Body mass index is 41.88 kg/m .  Physical Exam  Cardiovascular:      Pulses: Normal pulses.      Comments: Mild systolic murmur     Pulmonary:      Effort: Pulmonary effort is normal.   Abdominal:      General: Abdomen is flat.   Musculoskeletal:         General: Normal range of motion.   Skin:     General: Skin is warm.   Neurological:      General: No focal deficit present.   Psychiatric:         Mood and Affect: Mood normal.                    "

## 2023-07-06 ENCOUNTER — HOSPITAL ENCOUNTER (OUTPATIENT)
Dept: CARDIOLOGY | Facility: CLINIC | Age: 38
Discharge: HOME | End: 2023-07-06
Attending: FAMILY MEDICINE | Admitting: FAMILY MEDICINE
Payer: OTHER GOVERNMENT

## 2023-07-06 DIAGNOSIS — R01.1 HEART MURMUR, SYSTOLIC: ICD-10-CM

## 2023-07-06 LAB — LVEF ECHO: NORMAL

## 2023-07-06 PROCEDURE — 255N000002 HC RX 255 OP 636: Performed by: FAMILY MEDICINE

## 2023-07-06 PROCEDURE — 93306 TTE W/DOPPLER COMPLETE: CPT | Mod: 26 | Performed by: INTERNAL MEDICINE

## 2023-07-06 PROCEDURE — 999N000208 ECHOCARDIOGRAM COMPLETE

## 2023-07-06 RX ADMIN — HUMAN ALBUMIN MICROSPHERES AND PERFLUTREN 4 ML: 10; .22 INJECTION, SOLUTION INTRAVENOUS at 10:19

## 2023-08-19 ENCOUNTER — OFFICE VISIT (OUTPATIENT)
Dept: URGENT CARE | Facility: URGENT CARE | Age: 38
End: 2023-08-19
Payer: OTHER GOVERNMENT

## 2023-08-19 VITALS
TEMPERATURE: 98.1 F | RESPIRATION RATE: 16 BRPM | SYSTOLIC BLOOD PRESSURE: 112 MMHG | WEIGHT: 232.3 LBS | HEART RATE: 59 BPM | BODY MASS INDEX: 39.87 KG/M2 | OXYGEN SATURATION: 98 % | DIASTOLIC BLOOD PRESSURE: 78 MMHG

## 2023-08-19 DIAGNOSIS — J01.00 ACUTE MAXILLARY SINUSITIS, RECURRENCE NOT SPECIFIED: Primary | ICD-10-CM

## 2023-08-19 PROCEDURE — 99213 OFFICE O/P EST LOW 20 MIN: CPT | Performed by: PHYSICIAN ASSISTANT

## 2023-08-19 NOTE — PROGRESS NOTES
Assessment & Plan     Acute maxillary sinusitis, recurrence not specified  Augmentin Rx.  Continue Flonase nasal spray use and oral antihistamine. Tylenol or motrin prn headache . Follow up if any worsening symptoms. Patient agrees.     - amoxicillin-clavulanate (AUGMENTIN) 875-125 MG tablet  Dispense: 14 tablet; Refill: 0       Return in about 1 week (around 8/26/2023) for Symptoms failing to improve.    Luann Hatfield PA-C  [facility] TONIO Lynn is a 37 year old female who presents to clinic today for the following health issues:  Chief Complaint   Patient presents with    Sinus Problem     36 yo F presents with the following sinus pressure, headache low grade fever teeth hurt been having allergy issues  onset T-1  tx- ibuprofen and tylenol last dose at 730 am     HPI    Patient is presenting to urgent care today with complaint of sinus headache, sinus pain, sinus pressure.  Patient has been having allergies for a while now.  She is taking Allegra and using her Flonase nasal spray.  Sinus headache, teeth hurting, low-grade fever since last night. No cough.  Treatment tried: Ibuprofen/Tylenol--- not helping.      Review of Systems  Constitutional, HEENT, cardiovascular, pulmonary, GI, , musculoskeletal, neuro, skin, endocrine and psych systems are negative, except as otherwise noted.      Objective    /78   Pulse 59   Temp 98.1  F (36.7  C)   Resp 16   Wt 105.4 kg (232 lb 4.8 oz)   SpO2 98%   BMI 39.87 kg/m    Physical Exam   GENERAL: healthy, alert and no distress  HENT: ear canals and TM's normal, nose with boggy turbinates, maxillary sinuses are tender to percussion left more than right, mouth without ulcers or lesions  RESP: lungs clear to auscultation - no rales, rhonchi or wheezes  CV: regular rate and rhythm, normal S1 S2  MS: no gross musculoskeletal defects noted, no edema

## 2023-08-21 ENCOUNTER — NURSE TRIAGE (OUTPATIENT)
Dept: FAMILY MEDICINE | Facility: CLINIC | Age: 38
End: 2023-08-21
Payer: OTHER GOVERNMENT

## 2023-08-21 DIAGNOSIS — J32.1 FRONTAL SINUSITIS, UNSPECIFIED CHRONICITY: Primary | ICD-10-CM

## 2023-08-21 RX ORDER — DOXYCYCLINE 100 MG/1
100 CAPSULE ORAL 2 TIMES DAILY
Qty: 14 CAPSULE | Refills: 0 | Status: SHIPPED | OUTPATIENT
Start: 2023-08-21 | End: 2023-11-24

## 2023-08-21 NOTE — TELEPHONE ENCOUNTER
Nurse Triage SBAR    Is this a 2nd Level Triage? YES, LICENSED PRACTITIONER REVIEW IS REQUIRED    Situation: rash/hives    Background: Patient seen in  Sat. 8/19/23, started on Augmentin, developed rash on 8/20/23    Assessment: Suspected allergic reaction to Augmentin.  Some parts of rash are blotches and some look like pimples per patient. atient also has a history of allergy to Erythromycin.  She is having no difficulty breathing, swallowing or speaking and has no swelling of the face, lips, tongue. She takes Allegra every evening, took it last evening    Protocol Recommended Disposition:   See in Office Today, See More Appropriate Protocol    Recommendation: Await response from provider.  If she develops new or worsening symptoms she is to be seen in UC/ER.       Routed to provider    Does the patient meet one of the following criteria for ADS visit consideration? 16+ years old, with an [facility] PCP .  GUICHO Diaz R.N.      Reason for Disposition   Taking a new medicine now or within last 3 days  (Exception: Antihistamine, decongestant or other OTC cough/cold medicines.)   Hives or itching    Additional Information   Negative: Difficulty breathing or wheezing now   Negative: Rapid onset of swollen tongue   Negative: Rapid onset of hoarseness or cough   Negative: Very weak (can't stand)   Negative: Difficult to awaken or acting confused (e.g., disoriented, slurred speech)   Negative: Life-threatening reaction (anaphylaxis) in the past to similar substance (e.g., food, insect bite/sting, chemical, etc.) and < 2 hours since exposure   Negative: Sounds like a life-threatening emergency to the triager   Negative: Difficulty breathing or wheezing   Negative: Hoarseness or cough that started soon after 1st dose of drug   Negative: Swollen tongue that started soon after first dose of drug   Negative: Fever and purple or blood-colored spots or dots   Negative: Too weak or sick to stand   Negative: Sounds like a  "life-threatening emergency to the triager   Negative: Rash is only on 1 part of the body (localized)   Negative: Taking new non-prescription (OTC) antihistamine, decongestant, ear drops, eye drops, or other OTC cough/cold medicine   Negative: Taking new prescription antihistamine, allergy medicine, asthma medicine, eye drops, ear drops or nose drops   Negative: Rash started more than 3 days after stopping new prescription medicine   Negative: Swollen tongue   Negative: Widespread hives and onset < 2 hours of exposure to 1st dose of drug   Negative: Patient sounds very sick or weak to the triager   Negative: Fever   Negative: Face or lip swelling   Negative: Purple or blood-colored spots or dots (no fever and sounds well to triager)   Negative: Joint pain or swelling   Negative: Bloody crusts on lips or in mouth   Negative: Large or small blisters on skin (i.e., fluid filled bubbles or sacs)   Negative: Rash beginning within 4 hours of a new prescription medication    Answer Assessment - Initial Assessment Questions  1. APPEARANCE: \"What does the rash look like?\"       Some are red splotches and some almost look like a pimple.  Co-worker told her she had hives.  2. LOCATION: \"Where is the rash located?\"       Scalp, face, upper back, upper arms  and chest.  3. NUMBER: \"How many hives are there?\"       numerous  4. SIZE: \"How big are the hives?\" (inches, cm, compare to coins) \"Do they all look the same or is there lots of variation in shape and size?\"       Variation in shape and size  5. ONSET: \"When did the hives begin?\" (Hours or days ago)       Some time yesterday, unknown time and worse today.  6. ITCHING: \"Does it itch?\" If Yes, ask: \"How bad is the itch?\"     - MILD: doesn't interfere with normal activities    - MODERATE-SEVERE: interferes with work, school, sleep, or other activities       Yes moderate  7. RECURRENT PROBLEM: \"Have you had hives before?\" If Yes, ask: \"When was the last time?\" and \"What happened " "that time?\"       No  8. TRIGGERS: \"Were you exposed to any new food, plant, cosmetic product or animal just before the hives began?\"      Augmentin started 8/19/23 for sinusitis, has taken before but this time she broke out in rash.  9. OTHER SYMPTOMS: \"Do you have any other symptoms?\" (e.g., fever, tongue swelling, difficulty breathing, abdominal pain)      Denies swelling of lips, tongue or around eyes. She didn't have a fever this morning but feels hot. She is not having any difficulty speaking, denies wheezing, difficulty breathing or swallowing.  10. PREGNANCY: \"Is there any chance you are pregnant?\" \"When was your last menstrual period?\"    Protocols used: Hives-A-OH, Rash - Widespread On Drugs-A-OH    "

## 2023-08-21 NOTE — TELEPHONE ENCOUNTER
Talked with Dr. Wynn, recommends taking Zyrtec or Claritin in the AM, Benadryl at night to treat symptoms. Ok to stop Augmentin.  Pharmacy pended to start Doxycycline. Called patient and talked with her and gave recommendations. Doxycycline pended, will forward to Dr. Wynn.

## 2023-10-03 NOTE — PATIENT INSTRUCTIONS
Follow-up visit  Staff reports that the patient continues to decline and is weaker.  Appetite is poor.  He is requiring 2 person assist and is difficult to manage here in the assisted living type atmosphere.  There has been requested transfer to the SNF unit.  Family is in agreement.    Medications and orders reviewed.    Review of systems  Patient is a very poor historian and but does not verbalize any specific complaints when asked.  He specifically denies pain shortness of breath or GI problems or abdominal pain    Physical exam most recent blood pressures demonstrate a blood pressure of 102/75, temp 98 4, pulse 80, pulse ox is 96% on room air  The patient is sitting in his chair does not appear to be in any acute distress.  He appears pale and weak.  Lungs are clear.  Heart rate rhythm is regular.  Abdomen is soft nontender.  There is no peripheral edema.  Neurologically there are no focal deficits or tremors.  He has movement in all extremities but just appears weak    Assessment and care plan  Developing adult failure to thrive likely multifactorial.  He has advanced dementia.  We will transfer the patient to the SNF unit and commence further work-up with labs urinalysis and review of medication use.  We will list PT and OT as well to see if we can generate further strength and activity       For cough (can take all of them together):   - Dextromethorphan 'DM' (over the counter - can be found in Robitussin/Delsym/generic cough meds)  - Honey: lozenges/cough drops or mix honey in warm water      If symptoms worsen return to be evaluated

## 2023-11-24 ENCOUNTER — OFFICE VISIT (OUTPATIENT)
Dept: URGENT CARE | Facility: URGENT CARE | Age: 38
End: 2023-11-24
Payer: OTHER GOVERNMENT

## 2023-11-24 VITALS
TEMPERATURE: 98.9 F | OXYGEN SATURATION: 94 % | SYSTOLIC BLOOD PRESSURE: 130 MMHG | DIASTOLIC BLOOD PRESSURE: 84 MMHG | WEIGHT: 232 LBS | BODY MASS INDEX: 39.82 KG/M2 | HEART RATE: 105 BPM | RESPIRATION RATE: 16 BRPM

## 2023-11-24 DIAGNOSIS — J32.9 SINOBRONCHITIS: Primary | ICD-10-CM

## 2023-11-24 DIAGNOSIS — J40 SINOBRONCHITIS: Primary | ICD-10-CM

## 2023-11-24 PROCEDURE — 99213 OFFICE O/P EST LOW 20 MIN: CPT | Performed by: PHYSICIAN ASSISTANT

## 2023-11-24 RX ORDER — PSEUDOEPHEDRINE HCL 30 MG
TABLET ORAL EVERY 4 HOURS PRN
COMMUNITY
End: 2024-09-19

## 2023-11-24 RX ORDER — DOXYCYCLINE 100 MG/1
100 CAPSULE ORAL 2 TIMES DAILY
Qty: 14 CAPSULE | Refills: 0 | Status: SHIPPED | OUTPATIENT
Start: 2023-11-24 | End: 2023-12-01

## 2023-11-24 RX ORDER — PREDNISONE 20 MG/1
40 TABLET ORAL DAILY
Qty: 10 TABLET | Refills: 0 | Status: SHIPPED | OUTPATIENT
Start: 2023-11-24 | End: 2023-11-29

## 2023-11-24 RX ORDER — GUAIFENESIN AND PSEUDOEPHEDRINE HCL 1200; 120 MG/1; MG/1
TABLET, EXTENDED RELEASE ORAL
COMMUNITY
End: 2024-09-19

## 2023-11-24 NOTE — PROGRESS NOTES
Assessment & Plan     Sinobronchitis  Acute problem.  On exam patient is in no acute respiratory distress.  Doxycycline is prescribed today.  Prednisone is prescribed.  OTC cough medication as needed.  Advised to keep monitoring symptoms.  Follow-up if any worsening symptoms.  Patient agrees with the plan.  - doxycycline hyclate (VIBRAMYCIN) 100 MG capsule  Dispense: 14 capsule; Refill: 0  - predniSONE (DELTASONE) 20 MG tablet  Dispense: 10 tablet; Refill: 0       Return in about 10 days (around 12/4/2023) for Symptoms failing to improve.    Luann Hatfield PA-C  [facility] URG[facility] Phil CampbellLAURYN Lynn is a 38 year old female who presents to clinic today for the following health issues:  Chief Complaint   Patient presents with    Cough     Couple weeks. Hard time catching breath.   Sore diaphragm. Usually yellow mucus and productive.  Negative covid.    Sinus Problem     Pressure, and PND.    Fever     Weds night was 100.5     HPI      Sinus problem    Onset of symptoms was 2 week(s) ago.  Course of illness is worsening.    Severity moderate  Current and Associated symptoms: cough - productive, facial pain/pressure, headache,  fever 2 days ago, posttussive emesis  Treatment measures tried include Mucinex.  Predisposing factors include ill contact: daughter with similar symptoms.      Review of Systems  Constitutional, HEENT, cardiovascular, pulmonary, GI, , musculoskeletal, neuro, skin, endocrine and psych systems are negative, except as otherwise noted.      Objective    /84   Pulse 105   Temp 98.9  F (37.2  C) (Oral)   Resp 16   Wt 105.2 kg (232 lb)   SpO2 94%   BMI 39.82 kg/m    Physical Exam   GENERAL: healthy, alert and no distress  HENT: ear canals and TM's normal, nose with boggy turbinates and mouth without ulcers or lesions, uvula is midline  RESP: lungs with coarse breath sounds and mild expiratory wheezing over the lung fields  CV: regular rate and rhythm, normal  S1 S2  MS: no gross musculoskeletal defects noted, no edema

## 2023-11-27 ENCOUNTER — OFFICE VISIT (OUTPATIENT)
Dept: URGENT CARE | Facility: URGENT CARE | Age: 38
End: 2023-11-27
Payer: OTHER GOVERNMENT

## 2023-11-27 ENCOUNTER — ANCILLARY PROCEDURE (OUTPATIENT)
Dept: GENERAL RADIOLOGY | Facility: CLINIC | Age: 38
End: 2023-11-27
Attending: NURSE PRACTITIONER
Payer: OTHER GOVERNMENT

## 2023-11-27 VITALS
SYSTOLIC BLOOD PRESSURE: 124 MMHG | OXYGEN SATURATION: 97 % | DIASTOLIC BLOOD PRESSURE: 80 MMHG | TEMPERATURE: 98.2 F | RESPIRATION RATE: 18 BRPM | HEART RATE: 70 BPM

## 2023-11-27 DIAGNOSIS — R06.2 WHEEZING: ICD-10-CM

## 2023-11-27 DIAGNOSIS — R05.1 ACUTE COUGH: ICD-10-CM

## 2023-11-27 DIAGNOSIS — R06.2 WHEEZING: Primary | ICD-10-CM

## 2023-11-27 DIAGNOSIS — J22 LOWER RESPIRATORY INFECTION: ICD-10-CM

## 2023-11-27 PROCEDURE — 94640 AIRWAY INHALATION TREATMENT: CPT | Performed by: NURSE PRACTITIONER

## 2023-11-27 PROCEDURE — 71046 X-RAY EXAM CHEST 2 VIEWS: CPT | Mod: TC | Performed by: RADIOLOGY

## 2023-11-27 PROCEDURE — 99214 OFFICE O/P EST MOD 30 MIN: CPT | Mod: 25 | Performed by: NURSE PRACTITIONER

## 2023-11-27 RX ORDER — IPRATROPIUM BROMIDE AND ALBUTEROL SULFATE 2.5; .5 MG/3ML; MG/3ML
3 SOLUTION RESPIRATORY (INHALATION) ONCE
Status: COMPLETED | OUTPATIENT
Start: 2023-11-27 | End: 2023-11-27

## 2023-11-27 RX ORDER — BENZONATATE 200 MG/1
200 CAPSULE ORAL 3 TIMES DAILY PRN
Qty: 21 CAPSULE | Refills: 0 | Status: SHIPPED | OUTPATIENT
Start: 2023-11-27 | End: 2023-12-04

## 2023-11-27 RX ORDER — ALBUTEROL SULFATE 90 UG/1
2 AEROSOL, METERED RESPIRATORY (INHALATION) EVERY 6 HOURS PRN
Qty: 18 G | Refills: 0 | Status: SHIPPED | OUTPATIENT
Start: 2023-11-27 | End: 2024-09-19

## 2023-11-27 RX ORDER — AZITHROMYCIN 250 MG/1
TABLET, FILM COATED ORAL
Qty: 6 TABLET | Refills: 0 | Status: SHIPPED | OUTPATIENT
Start: 2023-11-27 | End: 2023-12-02

## 2023-11-27 RX ADMIN — IPRATROPIUM BROMIDE AND ALBUTEROL SULFATE 3 ML: 2.5; .5 SOLUTION RESPIRATORY (INHALATION) at 19:26

## 2023-11-28 NOTE — PROGRESS NOTES
Chief Complaint   Patient presents with    Sinus Problem     Pt reports sinus infection, SOB, wheezing, productive cough, sinus congestion.   Pt was seen in  on 11/25/23 for a sinus infection and bronchitis.      SUBJECTIVE:  Leah Cueto is a 38 year old female who presents to the clinic today with cough sob wheeze thick green mucus sinus congestion headaches for 3 weeks despite doxy prednisone treatment started 4 days ago.  She does have a smoking history, but no diagnosed asthma.  She had negative COVID testing.  Declines  fevers, bloody sputum, calf pain, swelling, DVT PE history.    Past Medical History:   Diagnosis Date    Numbness and tingling     [place] numbness and tingling to legs/ft    Other chronic pain     back    STD (sexually transmitted disease)      doxycycline hyclate (VIBRAMYCIN) 100 MG capsule, Take 1 capsule (100 mg) by mouth 2 times daily for 7 days  predniSONE (DELTASONE) 20 MG tablet, Take 2 tablets (40 mg) by mouth daily for 5 days  pseudoePHEDrine (SUDAFED) 30 MG tablet, Take by mouth every 4 hours as needed for congestion  PseudoePHEDrine-guaiFENesin 120-1200 MG TB12,   sertraline (ZOLOFT) 100 MG tablet, Take 1 tablet (100 mg) by mouth daily Take 100mg and 50mg to equal 150mg daily.  sertraline (ZOLOFT) 50 MG tablet, Take 2 tablets (100 mg) by mouth daily  traZODone (DESYREL) 50 MG tablet, Take 0.5 tablets (25 mg) by mouth At Bedtime  valACYclovir (VALTREX) 500 MG tablet, Take 1 tablet (500 mg) by mouth 2 times daily    No current facility-administered medications on file prior to visit.    Social History     Tobacco Use    Smoking status: Former    Smokeless tobacco: Never    Tobacco comments:     quit 12 yrs ago   Substance Use Topics    Alcohol use: Yes     Comment: 2x/ week     Allergies   Allergen Reactions    Augmentin [Amoxicillin-Pot Clavulanate] Hives    Erythromycin Nausea and Vomiting    Vicodin [Hydrocodone-Acetaminophen] Nausea and Vomiting       Review of Systems  All  systems negative except for those listed above in HPI.    EXAM:   /80 (BP Location: Right arm, Patient Position: Sitting, Cuff Size: Adult Regular)   Pulse 70   Temp 98.2  F (36.8  C) (Tympanic)   Resp 18   SpO2 97%     Physical Exam  Constitutional:       General: She is not in acute distress.     Appearance: She is well-developed. She is not ill-appearing, toxic-appearing or diaphoretic.   HENT:      Head: Normocephalic and atraumatic.      Right Ear: Tympanic membrane and ear canal normal.      Left Ear: Tympanic membrane and ear canal normal.      Nose: Congestion and rhinorrhea present.      Mouth/Throat:      Pharynx: No oropharyngeal exudate or posterior oropharyngeal erythema.   Eyes:      Conjunctiva/sclera: Conjunctivae normal.      Pupils: Pupils are equal, round, and reactive to light.   Cardiovascular:      Rate and Rhythm: Normal rate.      Pulses: Normal pulses.   Pulmonary:      Effort: Respiratory distress present.      Breath sounds: No stridor. Wheezing present. No rhonchi or rales.   Chest:      Chest wall: No tenderness.   Musculoskeletal:         General: Normal range of motion.      Cervical back: Normal range of motion and neck supple.   Lymphadenopathy:      Cervical: No cervical adenopathy.   Skin:     General: Skin is warm.      Capillary Refill: Capillary refill takes less than 2 seconds.      Findings: No rash.   Neurological:      General: No focal deficit present.      Mental Status: She is alert and oriented to person, place, and time.   Psychiatric:         Mood and Affect: Mood normal.         Behavior: Behavior normal.       X-ray done in clinic read by me as positive for bilateral faint patchy opacities.    ASSESSMENT:    ICD-10-CM    1. Wheezing  R06.2 ipratropium - albuterol 0.5 mg/2.5 mg/3 mL (DUONEB) neb solution 3 mL     INHALATION/NEBULIZER TREATMENT, INITIAL     XR Chest 2 Views     albuterol (PROAIR HFA/PROVENTIL HFA/VENTOLIN HFA) 108 (90 Base) MCG/ACT inhaler       2. Acute cough  R05.1 XR Chest 2 Views      3. Lower respiratory infection  J22 azithromycin (ZITHROMAX) 250 MG tablet     benzonatate (TESSALON) 200 MG capsule        PLAN:    Switch from doxycycline to Z-Cain for bronchitis  Erythromycin GI upset is not a true IgE mediated allergy rather a side effect  Take medicine with food and monitor for severe side effects  Not comfortable with Levaquin given patient is on prednisone and risk for tendon rupture  Tessalon Perles  Albuterol as needed for wheeze tightness shortness of breath  DuoNeb given in clinic today to relieve the wheezes  Low Wells risk score for DVT PE  Rest! Your body needs more rest to heal.  Drink plenty of fluids (warm fluids like tea or soup are soothing and reduce cough)  Sit in the bathroom with a hot shower running and breathe in the steam.  Honey may soothe your sore throat and help manage your cough- may take straight or in warm water with lemon juice.  Avoid smoke (cigarettes, bonfires, fireplace, wood burning stoves).  Take Tylenol or an NSAID such as ibuprofen or naproxen as needed for pain.  Delsym (dextromethorphan polistirex) is an over the counter cough medication that lasts 12 hours.   Mucinex or Robitussin (guiafenesin) thin mucus and may help it to loosen more quickly  Good handwashing is the best way to prevent spread of germs  Present to emergency room if you develop trouble breathing, swallowing or cough-up blood.  Follow up with your primary care provider if symptoms worsen or fail to improve as expected.    Follow up with primary care provider with any problems, questions or concerns or if symptoms worsen or fail to improve. Patient agreed to plan and verbalized understanding.    Noni Roberson, [facility]

## 2023-11-28 NOTE — PROGRESS NOTES
The following nebulizer treatment was given:     MEDICATION: Duoneb  : [employer]  LOT #: [id number]  EXPIRATION DATE:  02/2025  NDC # 4243-6261-48     Nebulizer Start Time:  7:23  Nebulizer Stop Time:  7:33  See Vital Signs Flowsheet     MILY Camacho

## 2023-11-28 NOTE — PATIENT INSTRUCTIONS
Switch from doxycycline to Z-Cain for bronchitis  Erythromycin GI upset is not a true IgE mediated allergy rather a side effect  Take medicine with food and monitor for severe side effects  Not comfortable with Levaquin given patient is on prednisone and risk for tendon rupture  Tessalon Perles  Albuterol as needed for wheeze tightness shortness of breath  DuoNeb given in clinic today to relieve the wheezes  Low Wells risk score for DVT PE  Rest! Your body needs more rest to heal.  Drink plenty of fluids (warm fluids like tea or soup are soothing and reduce cough)  Sit in the bathroom with a hot shower running and breathe in the steam.  Honey may soothe your sore throat and help manage your cough- may take straight or in warm water with lemon juice.  Avoid smoke (cigarettes, bonfires, fireplace, wood burning stoves).  Take Tylenol or an NSAID such as ibuprofen or naproxen as needed for pain.  Delsym (dextromethorphan polistirex) is an over the counter cough medication that lasts 12 hours.   Mucinex or Robitussin (guiafenesin) thin mucus and may help it to loosen more quickly  Good handwashing is the best way to prevent spread of germs  Present to emergency room if you develop trouble breathing, swallowing or cough-up blood.  Follow up with your primary care provider if symptoms worsen or fail to improve as expected.

## 2023-12-27 ENCOUNTER — HOSPITAL ENCOUNTER (EMERGENCY)
Facility: CLINIC | Age: 38
Discharge: HOME | End: 2023-12-27
Attending: EMERGENCY MEDICINE | Admitting: EMERGENCY MEDICINE
Payer: OTHER GOVERNMENT

## 2023-12-27 ENCOUNTER — APPOINTMENT (OUTPATIENT)
Dept: CT IMAGING | Facility: CLINIC | Age: 38
End: 2023-12-27
Attending: EMERGENCY MEDICINE
Payer: OTHER GOVERNMENT

## 2023-12-27 VITALS
DIASTOLIC BLOOD PRESSURE: 72 MMHG | SYSTOLIC BLOOD PRESSURE: 121 MMHG | RESPIRATION RATE: 20 BRPM | OXYGEN SATURATION: 97 % | TEMPERATURE: 97.8 F | HEART RATE: 58 BPM

## 2023-12-27 DIAGNOSIS — R10.84 GENERALIZED ABDOMINAL PAIN: ICD-10-CM

## 2023-12-27 LAB
ALBUMIN SERPL BCG-MCNC: 4.6 G/DL (ref 3.5–5.2)
ALBUMIN UR-MCNC: NEGATIVE MG/DL
ALP SERPL-CCNC: 83 U/L (ref 40–150)
ALT SERPL W P-5'-P-CCNC: 17 U/L (ref 0–50)
ANION GAP SERPL CALCULATED.3IONS-SCNC: 8 MMOL/L (ref 7–15)
APPEARANCE UR: CLEAR
AST SERPL W P-5'-P-CCNC: 21 U/L (ref 0–45)
ATRIAL RATE - MUSE: 57 BPM
BACTERIA #/AREA URNS HPF: ABNORMAL /HPF
BASOPHILS # BLD AUTO: 0.1 10E3/UL (ref 0–0.2)
BASOPHILS NFR BLD AUTO: 1 %
BILIRUB SERPL-MCNC: 0.3 MG/DL
BILIRUB UR QL STRIP: NEGATIVE
BUN SERPL-MCNC: 14.6 MG/DL (ref 6–20)
CALCIUM SERPL-MCNC: 9 MG/DL (ref 8.6–10)
CHLORIDE SERPL-SCNC: 104 MMOL/L (ref 98–107)
COLOR UR AUTO: ABNORMAL
CREAT SERPL-MCNC: 0.61 MG/DL (ref 0.51–0.95)
DEPRECATED HCO3 PLAS-SCNC: 25 MMOL/L (ref 22–29)
DIASTOLIC BLOOD PRESSURE - MUSE: NORMAL MMHG
EGFRCR SERPLBLD CKD-EPI 2021: >90 ML/MIN/1.73M2
EOSINOPHIL # BLD AUTO: 0.2 10E3/UL (ref 0–0.7)
EOSINOPHIL NFR BLD AUTO: 3 %
ERYTHROCYTE [DISTWIDTH] IN BLOOD BY AUTOMATED COUNT: 13.4 % (ref 10–15)
GLUCOSE SERPL-MCNC: 101 MG/DL (ref 70–99)
GLUCOSE UR STRIP-MCNC: NEGATIVE MG/DL
HCG UR QL: NEGATIVE
HCT VFR BLD AUTO: 43.3 % (ref 35–47)
HGB BLD-MCNC: 13.6 G/DL (ref 11.7–15.7)
HGB UR QL STRIP: NEGATIVE
HOLD SPECIMEN: NORMAL
HOLD SPECIMEN: NORMAL
IMM GRANULOCYTES # BLD: 0.1 10E3/UL
IMM GRANULOCYTES NFR BLD: 1 %
INTERPRETATION ECG - MUSE: NORMAL
KETONES UR STRIP-MCNC: NEGATIVE MG/DL
LEUKOCYTE ESTERASE UR QL STRIP: NEGATIVE
LIPASE SERPL-CCNC: 33 U/L (ref 13–60)
LYMPHOCYTES # BLD AUTO: 1.8 10E3/UL (ref 0.8–5.3)
LYMPHOCYTES NFR BLD AUTO: 24 %
MCH RBC QN AUTO: 29.9 PG (ref 26.5–33)
MCHC RBC AUTO-ENTMCNC: 31.4 G/DL (ref 31.5–36.5)
MCV RBC AUTO: 95 FL (ref 78–100)
MONOCYTES # BLD AUTO: 0.6 10E3/UL (ref 0–1.3)
MONOCYTES NFR BLD AUTO: 8 %
NEUTROPHILS # BLD AUTO: 4.6 10E3/UL (ref 1.6–8.3)
NEUTROPHILS NFR BLD AUTO: 63 %
NITRATE UR QL: NEGATIVE
NRBC # BLD AUTO: 0 10E3/UL
NRBC BLD AUTO-RTO: 0 /100
P AXIS - MUSE: 58 DEGREES
PH UR STRIP: 6 [PH] (ref 5–7)
PLATELET # BLD AUTO: 286 10E3/UL (ref 150–450)
POTASSIUM SERPL-SCNC: 4.4 MMOL/L (ref 3.4–5.3)
PR INTERVAL - MUSE: 150 MS
PROT SERPL-MCNC: 8.1 G/DL (ref 6.4–8.3)
QRS DURATION - MUSE: 76 MS
QT - MUSE: 424 MS
QTC - MUSE: 412 MS
R AXIS - MUSE: 40 DEGREES
RBC # BLD AUTO: 4.55 10E6/UL (ref 3.8–5.2)
RBC URINE: <1 /HPF
SODIUM SERPL-SCNC: 137 MMOL/L (ref 135–145)
SP GR UR STRIP: 1.01 (ref 1–1.03)
SQUAMOUS EPITHELIAL: <1 /HPF
SYSTOLIC BLOOD PRESSURE - MUSE: NORMAL MMHG
T AXIS - MUSE: 30 DEGREES
UROBILINOGEN UR STRIP-MCNC: NORMAL MG/DL
VENTRICULAR RATE- MUSE: 57 BPM
WBC # BLD AUTO: 7.2 10E3/UL (ref 4–11)
WBC URINE: 0 /HPF

## 2023-12-27 PROCEDURE — 83690 ASSAY OF LIPASE: CPT | Performed by: EMERGENCY MEDICINE

## 2023-12-27 PROCEDURE — 74177 CT ABD & PELVIS W/CONTRAST: CPT

## 2023-12-27 PROCEDURE — 81025 URINE PREGNANCY TEST: CPT | Performed by: EMERGENCY MEDICINE

## 2023-12-27 PROCEDURE — 250N000011 HC RX IP 250 OP 636: Performed by: EMERGENCY MEDICINE

## 2023-12-27 PROCEDURE — 80053 COMPREHEN METABOLIC PANEL: CPT | Performed by: EMERGENCY MEDICINE

## 2023-12-27 PROCEDURE — 99285 EMERGENCY DEPT VISIT HI MDM: CPT | Mod: 25

## 2023-12-27 PROCEDURE — 93005 ELECTROCARDIOGRAM TRACING: CPT

## 2023-12-27 PROCEDURE — 85025 COMPLETE CBC W/AUTO DIFF WBC: CPT | Performed by: EMERGENCY MEDICINE

## 2023-12-27 PROCEDURE — 250N000009 HC RX 250: Performed by: EMERGENCY MEDICINE

## 2023-12-27 PROCEDURE — 36415 COLL VENOUS BLD VENIPUNCTURE: CPT | Performed by: EMERGENCY MEDICINE

## 2023-12-27 PROCEDURE — 81001 URINALYSIS AUTO W/SCOPE: CPT | Performed by: EMERGENCY MEDICINE

## 2023-12-27 RX ORDER — IOPAMIDOL 755 MG/ML
61 INJECTION, SOLUTION INTRAVASCULAR ONCE
Status: COMPLETED | OUTPATIENT
Start: 2023-12-27 | End: 2023-12-27

## 2023-12-27 RX ADMIN — IOPAMIDOL 61 ML: 755 INJECTION, SOLUTION INTRAVENOUS at 11:24

## 2023-12-27 RX ADMIN — SODIUM CHLORIDE 80 ML: 9 INJECTION, SOLUTION INTRAVENOUS at 11:24

## 2023-12-27 ASSESSMENT — ACTIVITIES OF DAILY LIVING (ADL): ADLS_ACUITY_SCORE: 33

## 2023-12-27 NOTE — ED TRIAGE NOTES
Epigastric pain that wraps bilaterally around both sides to the back. Started 2 days ago. Worse with movement and palpation. Denies N/V/D. Painful on deep inspiration

## 2023-12-27 NOTE — ED PROVIDER NOTES
History     Chief Complaint:  Abdominal Pain       HPI   Leah Cueto is a 38 year old female 2-3 days worsening right flank chest wall pain back pain and abdominal pain in band like fashion.  Had URI last month.  No current cough or wheeze.  No Fever.  No NVD.  No dysuria.  Any movement any breathing causes the pain.   Has hx of csection and laminectomy.       Independent Historian:        Review of External Notes:      Medications:    albuterol (PROAIR HFA/PROVENTIL HFA/VENTOLIN HFA) 108 (90 Base) MCG/ACT inhaler  pseudoePHEDrine (SUDAFED) 30 MG tablet  PseudoePHEDrine-guaiFENesin 120-1200 MG TB12  sertraline (ZOLOFT) 100 MG tablet  sertraline (ZOLOFT) 50 MG tablet  traZODone (DESYREL) 50 MG tablet  valACYclovir (VALTREX) 500 MG tablet        Past Medical History:    Past Medical History:   Diagnosis Date    Numbness and tingling     Other chronic pain     STD (sexually transmitted disease)        Past Surgical History:    Past Surgical History:   Procedure Laterality Date    ABDOMEN SURGERY      c section    BACK SURGERY      lumbar spinal fusion    C/SECTION, CLASSICAL       SECTION N/A 03/15/2019    Procedure: REPEAT  SECTION;  Surgeon: Nikkie Goss MD;  Location:  [facility]    DILATION AND CURETTAGE SUCTION  2013    Procedure: DILATION AND CURETTAGE SUCTION;  DILATION AND CURETTAGE SUCTION;  Surgeon: Kya Goss MD;  Location:  OR    EXPLORE SPINE, REMOVE HARDWARE, COMBINED  2014    Procedure: COMBINED EXPLORE SPINE, REMOVE HARDWARE;;  Surgeon: Christopher Delgado MD;  Location: [facility] OR    FUSION LUMBAR ANTERIOR TWO LEVELS  2014    Procedure: FUSION LUMBAR ANTERIOR TWO LEVELS;  ANTERIOR LUMBAR FUSION AT L4-5 AND L5-S1 USING AN INTERVERTEBRAL PROSTHESIS AND INFUSE , HARDWARE REMOVAL AND REVISION DECOMPRESSION L4-S-1, POSTERIOR SPINAL FUSION L4-5-S1 USING DEMINERALIZATION BONE MATTRIX AND INSTRUMENTATION (REMOVING  HARDWARE (ARVIND); POSTERIOR HARDWARE REVERE  INSTRUMENTATION) CONTINENTAL INTERVERTEBRAL PROSTHESIS FROM [facility]    GYN SURGERY      d and c for theraputic     LAMINECTOMY, FUSION LUMBAR TWO LEVELS, COMBINED  2014    Procedure: COMBINED LAMINECTOMY, FUSION LUMBAR TWO LEVELS;;  Surgeon: Christopher Delgado MD;  Location:  OR    wisdom teeth[            Physical Exam   Patient Vitals for the past 24 hrs:   BP Temp Temp src Pulse Resp SpO2   23 1228 121/72 -- -- 58 -- 97 %   23 0723 138/88 97.8  F (36.6  C) Temporal 62 20 99 %        Physical Exam  General: Patient is well appearing. No distress.  Uncomfortable with movements.    Head: Atraumatic.  Eyes: Conjunctivae and EOM are normal. No scleral icterus.  Neck: Normal range of motion. Neck supple.   Cardiovascular: Normal rate, regular rhythm, normal heart sounds and intact distal pulses.   Pulmonary/Chest: Breath sounds normal. No respiratory distress.  Abdominal: Soft. Bowel sounds are normal. No distension. No tenderness. No rebound or guarding.   Musculoskeletal: Normal range of motion.  Skin: Warm and dry. No rash noted. Not diaphoretic.      Emergency Department Course   ECG  NSR HR 57    Imaging:  CT Chest (PE) Abdomen Pelvis w Contrast   Final Result   IMPRESSION:   1.  No pulmonary emboli. No acute findings in the chest, abdomen and   pelvis.      JOSELYN STRANGE MD            SYSTEM ID:  [id number]        Report per radiology    Laboratory:  Labs Ordered and Resulted from Time of ED Arrival to Time of ED Departure   COMPREHENSIVE METABOLIC PANEL - Abnormal       Result Value    Sodium 137      Potassium 4.4      Carbon Dioxide (CO2) 25      Anion Gap 8      Urea Nitrogen 14.6      Creatinine 0.61      GFR Estimate >90      Calcium 9.0      Chloride 104      Glucose 101 (*)     Alkaline Phosphatase 83      AST 21      ALT 17      Protein Total 8.1      Albumin 4.6      Bilirubin Total 0.3     CBC WITH PLATELETS AND DIFFERENTIAL - Abnormal    WBC Count 7.2      RBC Count 4.55       Hemoglobin 13.6      Hematocrit 43.3      MCV 95      MCH 29.9      MCHC 31.4 (*)     RDW 13.4      Platelet Count 286      % Neutrophils 63      % Lymphocytes 24      % Monocytes 8      % Eosinophils 3      % Basophils 1      % Immature Granulocytes 1      NRBCs per 100 WBC 0      Absolute Neutrophils 4.6      Absolute Lymphocytes 1.8      Absolute Monocytes 0.6      Absolute Eosinophils 0.2      Absolute Basophils 0.1      Absolute Immature Granulocytes 0.1      Absolute NRBCs 0.0     ROUTINE UA WITH MICROSCOPIC REFLEX TO CULTURE - Abnormal    Color Urine Straw      Appearance Urine Clear      Glucose Urine Negative      Bilirubin Urine Negative      Ketones Urine Negative      Specific Gravity Urine 1.009      Blood Urine Negative      pH Urine 6.0      Protein Albumin Urine Negative      Urobilinogen Urine Normal      Nitrite Urine Negative      Leukocyte Esterase Urine Negative      Bacteria Urine Moderate (*)     RBC Urine <1      WBC Urine 0      Squamous Epithelials Urine <1     LIPASE - Normal    Lipase 33     HCG QUALITATIVE URINE - Normal    hCG Urine Qualitative Negative          Procedures       Emergency Department Course & Assessments:             Interventions:  Medications   iopamidol (ISOVUE-370) solution 61 mL (61 mLs Intravenous $Given 12/27/23 1124)   sodium chloride 0.9 % bag 500mL for CT scan flush use (80 mLs Intravenous $Given 12/27/23 1124)        Assessments:      Independent Interpretation (X-rays, CTs, rhythm strip):  CT PE abd no acute abnl no PE    Consultations/Discussion of Management or Tests:         Social Determinants of Health affecting care:       Disposition:  The patient was discharged to home.     Impression & Plan        Medical Decision Making:  Pt has many days of vague above and below diapgragm sx with no red flags on vital chest exam abdominal exam.  EKG normal and labs all normal including urine.  CT PE Chest Abd Pelvis completely normal.   No identifiable acute  emergency cause.  Suggest follow up with PCP and return instructions given.     Diagnosis:    ICD-10-CM    1. Generalized abdominal pain  R10.84            Discharge Medications:  New Prescriptions    No medications on file            12/27/2023   Lacho Delaney MD Stevens, Andrew C, MD  12/27/23 1241

## 2024-02-17 ENCOUNTER — HEALTH MAINTENANCE LETTER (OUTPATIENT)
Age: 39
End: 2024-02-17

## 2024-05-16 DIAGNOSIS — B00.1 COLD SORE: ICD-10-CM

## 2024-05-16 RX ORDER — VALACYCLOVIR HYDROCHLORIDE 500 MG/1
500 TABLET, FILM COATED ORAL 2 TIMES DAILY
Qty: 30 TABLET | Refills: 0 | Status: SHIPPED | OUTPATIENT
Start: 2024-05-16 | End: 2024-09-19

## 2024-06-20 DIAGNOSIS — F41.9 ANXIETY: ICD-10-CM

## 2024-06-21 RX ORDER — SERTRALINE HYDROCHLORIDE 100 MG/1
100 TABLET, FILM COATED ORAL DAILY
Qty: 90 TABLET | Refills: 0 | Status: SHIPPED | OUTPATIENT
Start: 2024-06-21 | End: 2024-09-19

## 2024-06-21 NOTE — TELEPHONE ENCOUNTER
Patient notified. She declined to schedule appointment. She is aware she will need appointment scheduled prior to further refills.  Monisha Vargas,

## 2024-07-06 ENCOUNTER — HEALTH MAINTENANCE LETTER (OUTPATIENT)
Age: 39
End: 2024-07-06

## 2024-08-02 DIAGNOSIS — F41.9 ANXIETY: ICD-10-CM

## 2024-09-12 ENCOUNTER — HOSPITAL ENCOUNTER (OUTPATIENT)
Dept: MAMMOGRAPHY | Facility: CLINIC | Age: 39
Discharge: HOME | End: 2024-09-12
Attending: FAMILY MEDICINE | Admitting: FAMILY MEDICINE
Payer: OTHER GOVERNMENT

## 2024-09-12 DIAGNOSIS — Z12.31 VISIT FOR SCREENING MAMMOGRAM: ICD-10-CM

## 2024-09-12 PROCEDURE — 77063 BREAST TOMOSYNTHESIS BI: CPT

## 2024-09-16 DIAGNOSIS — F41.9 ANXIETY: ICD-10-CM

## 2024-09-16 RX ORDER — SERTRALINE HYDROCHLORIDE 100 MG/1
TABLET, FILM COATED ORAL
Qty: 90 TABLET | Refills: 0 | OUTPATIENT
Start: 2024-09-16

## 2024-09-16 NOTE — TELEPHONE ENCOUNTER
Patient states that she didn't start the request and she has enough to get to her to appointment later this week.     Carolina Walsh  Lead   ealnadira Aden

## 2024-09-16 NOTE — TELEPHONE ENCOUNTER
Patient is scheduled with your provider coming up, please let her know the new provider can fill this if needed    Erma Meraz/

## 2024-09-19 ENCOUNTER — OFFICE VISIT (OUTPATIENT)
Dept: FAMILY MEDICINE | Facility: CLINIC | Age: 39
End: 2024-09-19
Payer: OTHER GOVERNMENT

## 2024-09-19 VITALS
RESPIRATION RATE: 16 BRPM | WEIGHT: 233.8 LBS | HEIGHT: 64 IN | DIASTOLIC BLOOD PRESSURE: 76 MMHG | SYSTOLIC BLOOD PRESSURE: 115 MMHG | OXYGEN SATURATION: 98 % | HEART RATE: 62 BPM | TEMPERATURE: 98 F | BODY MASS INDEX: 39.91 KG/M2

## 2024-09-19 DIAGNOSIS — Z00.00 ROUTINE GENERAL MEDICAL EXAMINATION AT A HEALTH CARE FACILITY: Primary | ICD-10-CM

## 2024-09-19 DIAGNOSIS — F41.9 ANXIETY: ICD-10-CM

## 2024-09-19 DIAGNOSIS — B00.1 COLD SORE: ICD-10-CM

## 2024-09-19 DIAGNOSIS — Z11.59 NEED FOR HEPATITIS C SCREENING TEST: ICD-10-CM

## 2024-09-19 PROBLEM — E66.9 OBESITY: Status: ACTIVE | Noted: 2024-09-19

## 2024-09-19 LAB
ALBUMIN SERPL BCG-MCNC: 4.5 G/DL (ref 3.5–5.2)
ALP SERPL-CCNC: 83 U/L (ref 40–150)
ALT SERPL W P-5'-P-CCNC: 18 U/L (ref 0–50)
ANION GAP SERPL CALCULATED.3IONS-SCNC: 12 MMOL/L (ref 7–15)
AST SERPL W P-5'-P-CCNC: 24 U/L (ref 0–45)
BILIRUB SERPL-MCNC: 0.4 MG/DL
BUN SERPL-MCNC: 10.4 MG/DL (ref 6–20)
CALCIUM SERPL-MCNC: 9.1 MG/DL (ref 8.8–10.4)
CHLORIDE SERPL-SCNC: 102 MMOL/L (ref 98–107)
CHOLEST SERPL-MCNC: 145 MG/DL
CREAT SERPL-MCNC: 0.7 MG/DL (ref 0.51–0.95)
EGFRCR SERPLBLD CKD-EPI 2021: >90 ML/MIN/1.73M2
FASTING STATUS PATIENT QL REPORTED: NO
FASTING STATUS PATIENT QL REPORTED: NO
GLUCOSE SERPL-MCNC: 79 MG/DL (ref 70–99)
HCO3 SERPL-SCNC: 25 MMOL/L (ref 22–29)
HDLC SERPL-MCNC: 60 MG/DL
LDLC SERPL CALC-MCNC: 77 MG/DL
NONHDLC SERPL-MCNC: 85 MG/DL
POTASSIUM SERPL-SCNC: 3.9 MMOL/L (ref 3.4–5.3)
PROT SERPL-MCNC: 8 G/DL (ref 6.4–8.3)
SODIUM SERPL-SCNC: 139 MMOL/L (ref 135–145)
TRIGL SERPL-MCNC: 41 MG/DL

## 2024-09-19 PROCEDURE — 80061 LIPID PANEL: CPT | Performed by: GENERAL PRACTICE

## 2024-09-19 PROCEDURE — 80053 COMPREHEN METABOLIC PANEL: CPT | Performed by: GENERAL PRACTICE

## 2024-09-19 PROCEDURE — 99214 OFFICE O/P EST MOD 30 MIN: CPT | Mod: 25 | Performed by: GENERAL PRACTICE

## 2024-09-19 PROCEDURE — 99396 PREV VISIT EST AGE 40-64: CPT | Performed by: GENERAL PRACTICE

## 2024-09-19 PROCEDURE — 36415 COLL VENOUS BLD VENIPUNCTURE: CPT | Performed by: GENERAL PRACTICE

## 2024-09-19 RX ORDER — VALACYCLOVIR HYDROCHLORIDE 500 MG/1
500 TABLET, FILM COATED ORAL 2 TIMES DAILY
Qty: 30 TABLET | Refills: 5 | Status: SHIPPED | OUTPATIENT
Start: 2024-09-19

## 2024-09-19 RX ORDER — SERTRALINE HYDROCHLORIDE 100 MG/1
100 TABLET, FILM COATED ORAL DAILY
Qty: 90 TABLET | Refills: 3 | Status: SHIPPED | OUTPATIENT
Start: 2024-09-19

## 2024-09-19 ASSESSMENT — PAIN SCALES - GENERAL: PAINLEVEL: NO PAIN (0)

## 2024-09-19 NOTE — PATIENT INSTRUCTIONS
Patient Education   Preventive Care Advice   This is general advice given by our system to help you stay healthy. However, your care team may have specific advice just for you. Please talk to your care team about your preventive care needs.  Nutrition  Eat 5 or more servings of fruits and vegetables each day.  Try wheat bread, brown rice and whole grain pasta (instead of white bread, rice, and pasta).  Get enough calcium and vitamin D. Check the label on foods and aim for 100% of the RDA (recommended daily allowance).  Lifestyle  Exercise at least 150 minutes each week  (30 minutes a day, 5 days a week).  Do muscle strengthening activities 2 days a week. These help control your weight and prevent disease.  No smoking.  Wear sunscreen to prevent skin cancer.  Have a dental exam and cleaning every 6 months.  Yearly exams  See your health care team every year to talk about:  Any changes in your health.  Any medicines your care team has prescribed.  Preventive care, family planning, and ways to prevent chronic diseases.  Shots (vaccines)   HPV shots (up to age 26), if you've never had them before.  Hepatitis B shots (up to age 59), if you've never had them before.  COVID-19 shot: Get this shot when it's due.  Flu shot: Get a flu shot every year.  Tetanus shot: Get a tetanus shot every 10 years.  Pneumococcal, hepatitis A, and RSV shots: Ask your care team if you need these based on your risk.  Shingles shot (for age 50 and up)  General health tests  Diabetes screening:  Starting at age 35, Get screened for diabetes at least every 3 years.  If you are younger than age 35, ask your care team if you should be screened for diabetes.  Cholesterol test: At age 39, start having a cholesterol test every 5 years, or more often if advised.  Bone density scan (DEXA): At age 50, ask your care team if you should have this scan for osteoporosis (brittle bones).  Hepatitis C: Get tested at least once in your life.  STIs (sexually  transmitted infections)  Before age 24: Ask your care team if you should be screened for STIs.  After age 24: Get screened for STIs if you're at risk. You are at risk for STIs (including HIV) if:  You are sexually active with more than one person.  You don't use condoms every time.  You or a partner was diagnosed with a sexually transmitted infection.  If you are at risk for HIV, ask about PrEP medicine to prevent HIV.  Get tested for HIV at least once in your life, whether you are at risk for HIV or not.  Cancer screening tests  Cervical cancer screening: If you have a cervix, begin getting regular cervical cancer screening tests starting at age 21.  Breast cancer scan (mammogram): If you've ever had breasts, begin having regular mammograms starting at age 40. This is a scan to check for breast cancer.  Colon cancer screening: It is important to start screening for colon cancer at age 45.  Have a colonoscopy test every 10 years (or more often if you're at risk) Or, ask your provider about stool tests like a FIT test every year or Cologuard test every 3 years.  To learn more about your testing options, visit:   .  For help making a decision, visit:   https://bit.ly/pr34182.  Prostate cancer screening test: If you have a prostate, ask your care team if a prostate cancer screening test (PSA) at age 55 is right for you.  Lung cancer screening: If you are a current or former smoker ages 50 to 80, ask your care team if ongoing lung cancer screenings are right for you.  For informational purposes only. Not to replace the advice of your health care provider. Copyright   2023 [employer]. All rights reserved. Clinically reviewed by the [facility] Transitions Program. [employer] [id number] - REV 01/24.

## 2024-09-19 NOTE — PROGRESS NOTES
"Preventive Care Visit  [facility] MORALES Todd MD, Internal Medicine  Sep 19, 2024      Assessment & Plan     Routine general medical examination at a health care facility  UTD with mammogram  Due for pap, will return another for pap due to a period  - Lipid panel reflex to direct LDL Non-fasting; Future  - Comprehensive metabolic panel (BMP + Alb, Alk Phos, ALT, AST, Total. Bili, TP); Future  - Lipid panel reflex to direct LDL Non-fasting  - Comprehensive metabolic panel (BMP + Alb, Alk Phos, ALT, AST, Total. Bili, TP)    Anxiety  Stable   Refill  - sertraline (ZOLOFT) 100 MG tablet; Take 1 tablet (100 mg) by mouth daily. Take 100mg and 50mg to equal 150mg daily.  - sertraline (ZOLOFT) 50 MG tablet; Take 1 tablet (50 mg) by mouth daily.    Cold sore  Refill  - valACYclovir (VALTREX) 500 MG tablet; Take 1 tablet (500 mg) by mouth 2 times daily.    Need for hepatitis C screening test  Denies                  BMI  Estimated body mass index is 40.13 kg/m  as calculated from the following:    Height as of this encounter: 1.626 m (5' 4\").    Weight as of this encounter: 106.1 kg (233 lb 12.8 oz).       Counseling  Appropriate preventive services were addressed with this patient via screening, questionnaire, or discussion as appropriate for fall prevention, nutrition, physical activity, Tobacco-use cessation, social engagement, weight loss and cognition.  Checklist reviewing preventive services available has been given to the patient.  Reviewed patient's diet, addressing concerns and/or questions.   The patient was instructed to see the dentist every 6 months.           Esau Lynn is a 38 year old, presenting for the following:  Physical (Preventative adult visit)        9/19/2024     2:03 PM   Additional Questions   Roomed by Eli Mendes         9/19/2024     2:03 PM   Patient Reported Additional Medications   Patient reports taking the following new medications Compounded Semaglutide " (prescribed online) In week 7 of treatment        Health Care Directive  Patient does not have a Health Care Directive or Living Will: Discussed advance care planning with patient; however, patient declined at this time.    Physical Exam    Needs valtrex 1-2 x per month.     Having a period today, will return for pap another day          Anxiety   How are you doing with your anxiety since your last visit? Improved   Are you having other symptoms that might be associated with anxiety? No  Have you had a significant life event? No   Are you feeling depressed? No  Do you have any concerns with your use of alcohol or other drugs? No    Social History     Tobacco Use    Smoking status: Former    Smokeless tobacco: Never    Tobacco comments:     quit 12 yrs ago   Vaping Use    Vaping status: Never Used   Substance Use Topics    Alcohol use: Yes     Comment: 2x/ week    Drug use: No         6/5/2023    11:42 AM   JEANINE-7 SCORE   Total Score 4 (minimal anxiety)   Total Score 4          No data to display                   No data to display                  6/5/2023    11:42 AM   JEANINE-7    1. Feeling nervous, anxious, or on edge 1   2. Not being able to stop or control worrying 1   3. Worrying too much about different things 0   4. Trouble relaxing 0   5. Being so restless that it is hard to sit still 0   6. Becoming easily annoyed or irritable 1   7. Feeling afraid, as if something awful might happen 1   JEANINE-7 Total Score 4   If you checked any problems, how difficult have they made it for you to do your work, take care of things at home, or get along with other people? Somewhat difficult           9/18/2024   General Health   How would you rate your overall physical health? (!) FAIR   Feel stress (tense, anxious, or unable to sleep) To some extent      (!) STRESS CONCERN      9/18/2024   Nutrition   Three or more servings of calcium each day? Yes   Diet: Regular (no restrictions)   How many servings of fruit and vegetables  per day? (!) 2-3   How many sweetened beverages each day? 0-1            9/18/2024   Exercise   Days per week of moderate/strenous exercise 4 days   Average minutes spent exercising at this level 30 min            9/18/2024   Social Factors   Frequency of gathering with friends or relatives Twice a week   Worry food won't last until get money to buy more No   Food not last or not have enough money for food? No   Do you have housing? (Housing is defined as stable permanent housing and does not include staying ouside in a car, in a tent, in an abandoned building, in an overnight shelter, or couch-surfing.) Yes   Are you worried about losing your housing? No   Lack of transportation? No   Unable to get utilities (heat,electricity)? No            9/18/2024   Dental   Dentist two times every year? (!) NO            9/18/2024   TB Screening   Were you born outside of the [place]? No            Today's PHQ-2 Score:       9/18/2024     3:25 PM   PHQ-2 ( 1999 [employer])   Q1: Little interest or pleasure in doing things 0   Q2: Feeling down, depressed or hopeless 0   PHQ-2 Score 0   Q1: Little interest or pleasure in doing things Not at all   Q2: Feeling down, depressed or hopeless Not at all   PHQ-2 Score 0           9/18/2024   Substance Use   Alcohol more than 3/day or more than 7/wk No   Do you use any other substances recreationally? No        Social History     Tobacco Use    Smoking status: Former    Smokeless tobacco: Never    Tobacco comments:     quit 12 yrs ago   Vaping Use    Vaping status: Never Used   Substance Use Topics    Alcohol use: Yes     Comment: 2x/ week    Drug use: No           9/12/2024   LAST FHS-7 RESULTS   1st degree relative breast or ovarian cancer No   Any relative bilateral breast cancer No   Any male have breast cancer No   Any ONE woman have BOTH breast AND ovarian cancer No   Any woman with breast cancer before 50yrs No   2 or more relatives with breast AND/OR ovarian cancer No   2 or more  "relatives with breast AND/OR bowel cancer No                   9/18/2024   STI Screening   New sexual partner(s) since last STI/HIV test? No        History of abnormal Pap smear:        ASCVD Risk   The ASCVD Risk score (Olga FORBES, et al., 2019) failed to calculate for the following reasons:    Cannot find a previous HDL lab    Cannot find a previous total cholesterol lab        9/18/2024   Contraception/Family Planning   Questions about contraception or family planning No           Reviewed and updated as needed this visit by Provider                          Review of Systems  Constitutional, HEENT, cardiovascular, pulmonary, GI, , musculoskeletal, neuro, skin, endocrine and psych systems are negative, except as otherwise noted.     Objective    Exam  /76 (BP Location: Right arm, Patient Position: Sitting, Cuff Size: Adult Large)   Pulse 62   Temp 98  F (36.7  C) (Oral)   Resp 16   Ht 1.626 m (5' 4\")   Wt 106.1 kg (233 lb 12.8 oz)   LMP 09/18/2024 (Exact Date)   SpO2 98%   BMI 40.13 kg/m     Estimated body mass index is 40.13 kg/m  as calculated from the following:    Height as of this encounter: 1.626 m (5' 4\").    Weight as of this encounter: 106.1 kg (233 lb 12.8 oz).    Physical Exam  Constitutional:       Appearance: Normal appearance.   HENT:      Head: Normocephalic and atraumatic.      Nose: Nose normal.      Mouth/Throat:      Mouth: Mucous membranes are moist.      Pharynx: Oropharynx is clear.   Eyes:      Extraocular Movements: Extraocular movements intact.      Conjunctiva/sclera: Conjunctivae normal.   Cardiovascular:      Rate and Rhythm: Normal rate and regular rhythm.      Heart sounds: Normal heart sounds.   Pulmonary:      Effort: Pulmonary effort is normal.      Breath sounds: Normal breath sounds.   Abdominal:      General: Abdomen is flat.      Palpations: Abdomen is soft.   Musculoskeletal:         General: Normal range of motion.      Cervical back: Normal range of " motion and neck supple.   Skin:     General: Skin is warm.   Neurological:      General: No focal deficit present.      Mental Status: She is alert and oriented to person, place, and time. Mental status is at baseline.   Psychiatric:         Mood and Affect: Mood and affect normal.         Speech: Speech normal.         Behavior: Behavior normal. Behavior is cooperative.         Thought Content: Thought content normal.         Cognition and Memory: Cognition normal.         Judgment: Judgment normal.               Signed Electronically by: Susy Todd MD

## 2025-02-27 ENCOUNTER — TELEPHONE (OUTPATIENT)
Dept: FAMILY MEDICINE | Facility: CLINIC | Age: 40
End: 2025-02-27
Payer: COMMERCIAL

## 2025-02-27 NOTE — TELEPHONE ENCOUNTER
Patient Quality Outreach    Patient is due for the following:   Cervical Cancer Screening - PAP Needed      Topic Date Due    Diptheria Tetanus Pertussis (DTAP/TDAP/TD) Vaccine (5 - Td or Tdap) 08/19/2024    Flu Vaccine (1) 09/01/2024    COVID-19 Vaccine (4 - 2024-25 season) 09/01/2024       Action(s) Taken:   Schedule a office visit for pap    Type of outreach:    Sent [employer] message.    Questions for provider review:    None           Mariah Romero

## 2025-06-25 ENCOUNTER — OFFICE VISIT (OUTPATIENT)
Dept: FAMILY MEDICINE | Facility: CLINIC | Age: 40
End: 2025-06-25
Payer: COMMERCIAL

## 2025-06-25 VITALS
WEIGHT: 211.3 LBS | RESPIRATION RATE: 16 BRPM | DIASTOLIC BLOOD PRESSURE: 74 MMHG | BODY MASS INDEX: 36.07 KG/M2 | SYSTOLIC BLOOD PRESSURE: 109 MMHG | TEMPERATURE: 98 F | HEART RATE: 65 BPM | OXYGEN SATURATION: 100 % | HEIGHT: 64 IN

## 2025-06-25 DIAGNOSIS — Z12.4 CERVICAL CANCER SCREENING: Primary | ICD-10-CM

## 2025-06-25 DIAGNOSIS — N92.0 SPOTTING BETWEEN MENSES: ICD-10-CM

## 2025-06-25 PROCEDURE — 3074F SYST BP LT 130 MM HG: CPT | Performed by: GENERAL PRACTICE

## 2025-06-25 PROCEDURE — G0145 SCR C/V CYTO,THINLAYER,RESCR: HCPCS | Performed by: GENERAL PRACTICE

## 2025-06-25 PROCEDURE — 99213 OFFICE O/P EST LOW 20 MIN: CPT | Performed by: GENERAL PRACTICE

## 2025-06-25 PROCEDURE — 3078F DIAST BP <80 MM HG: CPT | Performed by: GENERAL PRACTICE

## 2025-06-25 PROCEDURE — 1126F AMNT PAIN NOTED NONE PRSNT: CPT | Performed by: GENERAL PRACTICE

## 2025-06-25 PROCEDURE — 87624 HPV HI-RISK TYP POOLED RSLT: CPT | Performed by: GENERAL PRACTICE

## 2025-06-25 ASSESSMENT — PAIN SCALES - GENERAL: PAINLEVEL_OUTOF10: NO PAIN (0)

## 2025-06-25 NOTE — PROGRESS NOTES
"  {PROVIDER CHARTING PREFERENCE:351175}    Subjective   Leah is a 39 year old, presenting for the following health issues:  Follow Up        6/25/2025     3:07 PM   Additional Questions   Roomed by Sariah HALE CMA   Accompanied by ESTEBAN         6/25/2025     3:07 PM   Patient Reported Additional Medications   Patient reports taking the following new medications None     History of Present Illness       Reason for visit:  Yearly pap   She is taking medications regularly.      Patient is being seen today for a pap smear- patient states that some months she will spot for a couple of days a week prior to her period then she gets a full 7 day cycle a week later.     {SUPERLIST (Optional):749121}  {additonal problems for provider to add (Optional):910059}    {ROS Picklists (Optional):083053}      Objective    /74 (BP Location: Right arm, Patient Position: Sitting, Cuff Size: Adult Large)   Pulse 65   Temp 98  F (36.7  C) (Oral)   Resp 16   Ht 1.626 m (5' 4\")   Wt 95.8 kg (211 lb 4.8 oz)   LMP 06/10/2025 (Exact Date)   SpO2 100%   BMI 36.27 kg/m    Body mass index is 36.27 kg/m .  Physical Exam   {Exam List (Optional):852706}    {Diagnostic Test Results (Optional):582370}        Signed Electronically by: Susy Todd MD  {Email feedback regarding this note to [email or web address]   :[id number]}  "   Eyes:      Extraocular Movements: Extraocular movements intact.   Cardiovascular:      Rate and Rhythm: Normal rate and regular rhythm.   Pulmonary:      Effort: Pulmonary effort is normal.      Breath sounds: Normal breath sounds.   Abdominal:      Palpations: Abdomen is soft.   Genitourinary:     General: Normal vulva.   Musculoskeletal:         General: Normal range of motion.      Cervical back: Normal range of motion.   Skin:     General: Skin is warm.   Neurological:      General: No focal deficit present.      Mental Status: She is alert and oriented to person, place, and time. Mental status is at baseline.   Psychiatric:         Mood and Affect: Mood normal.         Behavior: Behavior normal.         Thought Content: Thought content normal.         Judgment: Judgment normal.                    Signed Electronically by: Susy Todd MD

## 2025-06-26 LAB
HPV HR 12 DNA CVX QL NAA+PROBE: NEGATIVE
HPV16 DNA CVX QL NAA+PROBE: NEGATIVE
HPV18 DNA CVX QL NAA+PROBE: NEGATIVE
HUMAN PAPILLOMA VIRUS FINAL DIAGNOSIS: NORMAL

## 2025-06-27 ENCOUNTER — RESULTS FOLLOW-UP (OUTPATIENT)
Dept: OBGYN | Facility: CLINIC | Age: 40
End: 2025-06-27

## 2025-06-30 LAB
BKR AP ASSOCIATED HPV REPORT: NORMAL
BKR LAB AP GYN ADEQUACY: NORMAL
BKR LAB AP GYN INTERPRETATION: NORMAL
BKR LAB AP LMP: NORMAL
BKR LAB AP PREVIOUS ABNORMAL: NORMAL
PATH REPORT.COMMENTS IMP SPEC: NORMAL
PATH REPORT.COMMENTS IMP SPEC: NORMAL
PATH REPORT.RELEVANT HX SPEC: NORMAL

## 2025-08-20 ENCOUNTER — PATIENT OUTREACH (OUTPATIENT)
Dept: CARE COORDINATION | Facility: CLINIC | Age: 40
End: 2025-08-20
Payer: COMMERCIAL

## (undated) DEVICE — STOCKING SLEEVE VASOPRESS COMPRESSION CALF MED 18" VP501M

## (undated) DEVICE — CAP BABY PINK/BLUE IC-2

## (undated) DEVICE — SU VICRYL 3-0 CT-1 36" J944H

## (undated) DEVICE — CATH TRAY FOLEY SURESTEP 16FR DRAIN BAG STATOCK A899916

## (undated) DEVICE — SUCTION CANISTER MEDIVAC LINER 3000ML W/LID 65651-530

## (undated) DEVICE — PAD CHUX UNDERPAD 30X36" P3036C

## (undated) DEVICE — LINEN TOWEL PACK X10 5473

## (undated) DEVICE — DRSG TEGADERM 4X10" 1627

## (undated) DEVICE — PACK C-SECTION LF PL15OTA83B

## (undated) DEVICE — GLOVE PROTEXIS MICRO 6.5  2D73PM65

## (undated) DEVICE — ESU GROUND PAD ADULT W/CORD E7507

## (undated) DEVICE — LINEN HALF SHEET 5512

## (undated) DEVICE — BAG CLEAR TRASH 1.3M 39X33" P4040C

## (undated) DEVICE — DRAPE IOBAN C-SECTION W/POUCH 30X35" 6657

## (undated) DEVICE — LINEN FULL SHEET 5511

## (undated) DEVICE — SOL NACL 0.9% IRRIG 1000ML BOTTLE 2F7124

## (undated) DEVICE — BLADE CLIPPER SGL USE 9680

## (undated) DEVICE — SOLIDIFIER FLUID RED 1500ML LIQUID KIT SYS POWDER ISOB1500

## (undated) DEVICE — SU VICRYL 0 CT-1 36" J346H

## (undated) DEVICE — TRANSFER DEVICE BLOOD NDL HOLDER 364880

## (undated) DEVICE — LINEN BABY BLANKET 5434

## (undated) DEVICE — PREP CHLORAPREP 26ML TINTED ORANGE  260815

## (undated) DEVICE — SOL WATER IRRIG 1000ML BOTTLE 2F7114

## (undated) DEVICE — LINEN DRAPE 54X72" 5467

## (undated) DEVICE — Device

## (undated) RX ORDER — FENTANYL CITRATE 50 UG/ML
INJECTION, SOLUTION INTRAMUSCULAR; INTRAVENOUS
Status: DISPENSED
Start: 2019-03-15

## (undated) RX ORDER — MORPHINE SULFATE 1 MG/ML
INJECTION, SOLUTION EPIDURAL; INTRATHECAL; INTRAVENOUS
Status: DISPENSED
Start: 2019-03-15

## (undated) RX ORDER — OXYTOCIN/0.9 % SODIUM CHLORIDE 30/500 ML
PLASTIC BAG, INJECTION (ML) INTRAVENOUS
Status: DISPENSED
Start: 2019-03-15